# Patient Record
Sex: FEMALE
[De-identification: names, ages, dates, MRNs, and addresses within clinical notes are randomized per-mention and may not be internally consistent; named-entity substitution may affect disease eponyms.]

---

## 2017-05-03 ENCOUNTER — APPOINTMENT (OUTPATIENT)
Dept: FAMILY MEDICINE | Facility: CLINIC | Age: 57
End: 2017-05-03

## 2017-05-03 VITALS
HEIGHT: 62 IN | WEIGHT: 125 LBS | BODY MASS INDEX: 23 KG/M2 | DIASTOLIC BLOOD PRESSURE: 72 MMHG | SYSTOLIC BLOOD PRESSURE: 108 MMHG

## 2017-05-04 LAB
25(OH)D3 SERPL-MCNC: 17.2 NG/ML
CHOLEST SERPL-MCNC: 266 MG/DL
CHOLEST/HDLC SERPL: 3.5 RATIO
FOLATE SERPL-MCNC: >20 NG/ML
HBA1C MFR BLD HPLC: 6 %
HDLC SERPL-MCNC: 75 MG/DL
LDLC SERPL CALC-MCNC: 164 MG/DL
TRIGL SERPL-MCNC: 137 MG/DL
TSH SERPL-ACNC: 0.72 UIU/ML
VIT B12 SERPL-MCNC: 1025 PG/ML

## 2017-08-14 ENCOUNTER — RX RENEWAL (OUTPATIENT)
Age: 57
End: 2017-08-14

## 2017-11-27 ENCOUNTER — APPOINTMENT (OUTPATIENT)
Dept: FAMILY MEDICINE | Facility: CLINIC | Age: 57
End: 2017-11-27

## 2018-02-20 ENCOUNTER — APPOINTMENT (OUTPATIENT)
Dept: FAMILY MEDICINE | Facility: CLINIC | Age: 58
End: 2018-02-20
Payer: MEDICAID

## 2018-02-20 VITALS
HEART RATE: 80 BPM | WEIGHT: 130 LBS | BODY MASS INDEX: 23.92 KG/M2 | HEIGHT: 62 IN | TEMPERATURE: 98.3 F | OXYGEN SATURATION: 97 % | DIASTOLIC BLOOD PRESSURE: 80 MMHG | SYSTOLIC BLOOD PRESSURE: 118 MMHG

## 2018-02-20 LAB — CYTOLOGY CVX/VAG DOC THIN PREP: NORMAL

## 2018-02-20 PROCEDURE — 99213 OFFICE O/P EST LOW 20 MIN: CPT

## 2018-04-19 ENCOUNTER — APPOINTMENT (OUTPATIENT)
Dept: FAMILY MEDICINE | Facility: CLINIC | Age: 58
End: 2018-04-19

## 2018-05-09 ENCOUNTER — APPOINTMENT (OUTPATIENT)
Dept: FAMILY MEDICINE | Facility: CLINIC | Age: 58
End: 2018-05-09
Payer: COMMERCIAL

## 2018-05-09 VITALS — OXYGEN SATURATION: 98 % | HEART RATE: 64 BPM

## 2018-05-09 VITALS — HEIGHT: 62 IN | WEIGHT: 130 LBS | BODY MASS INDEX: 23.92 KG/M2

## 2018-05-09 VITALS — DIASTOLIC BLOOD PRESSURE: 80 MMHG | SYSTOLIC BLOOD PRESSURE: 110 MMHG

## 2018-05-09 DIAGNOSIS — Z87.09 PERSONAL HISTORY OF OTHER DISEASES OF THE RESPIRATORY SYSTEM: ICD-10-CM

## 2018-05-09 PROCEDURE — 99214 OFFICE O/P EST MOD 30 MIN: CPT

## 2018-05-09 RX ORDER — CEFADROXIL 500 MG/1
500 CAPSULE ORAL TWICE DAILY
Qty: 20 | Refills: 0 | Status: DISCONTINUED | COMMUNITY
Start: 2018-02-20 | End: 2018-05-09

## 2018-05-16 ENCOUNTER — APPOINTMENT (OUTPATIENT)
Dept: FAMILY MEDICINE | Facility: CLINIC | Age: 58
End: 2018-05-16
Payer: COMMERCIAL

## 2018-05-16 VITALS
TEMPERATURE: 98.2 F | HEART RATE: 74 BPM | OXYGEN SATURATION: 98 % | HEIGHT: 62 IN | WEIGHT: 130 LBS | BODY MASS INDEX: 23.92 KG/M2

## 2018-05-16 LAB — CYTOLOGY CVX/VAG DOC THIN PREP: NORMAL

## 2018-05-16 PROCEDURE — 36415 COLL VENOUS BLD VENIPUNCTURE: CPT

## 2018-05-16 PROCEDURE — 99396 PREV VISIT EST AGE 40-64: CPT | Mod: 25

## 2018-05-16 NOTE — HEALTH RISK ASSESSMENT
[Good] : ~his/her~ current health as good [Fair] :  ~his/her~ mood as fair [2] : 1) Little interest or pleasure doing things for more than half of the days (2) [1] : 2) Feeling down, depressed, or hopeless for several days (1) [Employed] : employed [Significant Other] : lives with significant other [Feels Safe at Home] : Feels safe at home [Fully functional (bathing, dressing, toileting, transferring, walking, feeding)] : Fully functional (bathing, dressing, toileting, transferring, walking, feeding) [Fully functional (using the telephone, shopping, preparing meals, housekeeping, doing laundry, using] : Fully functional and needs no help or supervision to perform IADLs (using the telephone, shopping, preparing meals, housekeeping, doing laundry, using transportation, managing medications and managing finances) [Designated Healthcare Proxy] : Designated healthcare proxy [Name: ___] : Health Care Proxy's Name: [unfilled]  [Relationship: ___] : Relationship: [unfilled] [] : No [Change in mental status noted] : No change in mental status noted [Language] : denies difficulty with language [Handling Complex Tasks] : denies difficulty handling complex tasks [Sexually Active] : not sexually active [Reports changes in hearing] : Reports no changes in hearing [Reports changes in vision] : Reports no changes in vision [Reports changes in dental health] : Reports no changes in dental health [Guns at Home] : no guns at home [Travel to Developing Areas] : does not  travel to developing areas [TB Exposure] : is not being exposed to tuberculosis [MammogramDate] : 2016 [PapSmearDate] : 2016 [ColonoscopyDate] : 2013 [de-identified] : w/ brother [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

## 2018-05-16 NOTE — COUNSELING
[Healthy eating counseling provided] : healthy eating [Activity counseling provided] : activity [Behavioral health counseling provided] : behavioral health  [Patient motivation] : Patient motivation [Good understanding] : Patient has a good understanding of lifestyle changes and the steps needed to achieve self management goals

## 2018-05-16 NOTE — PHYSICAL EXAM
[No Acute Distress] : no acute distress [Well Nourished] : well nourished [Well Developed] : well developed [Well-Appearing] : well-appearing [Normal Sclera/Conjunctiva] : normal sclera/conjunctiva [PERRL] : pupils equal round and reactive to light [EOMI] : extraocular movements intact [Normal Outer Ear/Nose] : the outer ears and nose were normal in appearance [Normal Oropharynx] : the oropharynx was normal [No JVD] : no jugular venous distention [Supple] : supple [No Lymphadenopathy] : no lymphadenopathy [Thyroid Normal, No Nodules] : the thyroid was normal and there were no nodules present [No Respiratory Distress] : no respiratory distress  [Clear to Auscultation] : lungs were clear to auscultation bilaterally [No Accessory Muscle Use] : no accessory muscle use [Normal Rate] : normal rate  [Regular Rhythm] : with a regular rhythm [Normal S1, S2] : normal S1 and S2 [No Murmur] : no murmur heard [No Carotid Bruits] : no carotid bruits [No Varicosities] : no varicosities [Pedal Pulses Present] : the pedal pulses are present [No Edema] : there was no peripheral edema [No Extremity Clubbing/Cyanosis] : no extremity clubbing/cyanosis [Soft] : abdomen soft [Non Tender] : non-tender [Non-distended] : non-distended [No HSM] : no HSM [Normal Bowel Sounds] : normal bowel sounds [Normal Posterior Cervical Nodes] : no posterior cervical lymphadenopathy [Normal Anterior Cervical Nodes] : no anterior cervical lymphadenopathy [No CVA Tenderness] : no CVA  tenderness [No Spinal Tenderness] : no spinal tenderness [No Joint Swelling] : no joint swelling [Grossly Normal Strength/Tone] : grossly normal strength/tone [No Rash] : no rash [Normal Gait] : normal gait [Coordination Grossly Intact] : coordination grossly intact [No Focal Deficits] : no focal deficits [Normal Affect] : the affect was normal [Normal Insight/Judgement] : insight and judgment were intact [Normal Appearance] : normal in appearance [No Masses] : no palpable masses [No Nipple Discharge] : no nipple discharge [No Axillary Lymphadenopathy] : no axillary lymphadenopathy [Alert and Oriented x3] : oriented to person, place, and time [de-identified] : left breast tenderness w/ palpation [de-identified] : mandible pain w/ jaw flexion [de-identified] : enlarging hyperpigmented mole like below rt breast

## 2018-05-16 NOTE — HISTORY OF PRESENT ILLNESS
[FreeTextEntry1] : c/o on and off knee pain, does not take meds\par c/o teeth grinding prob due to stress, now having mandible pain\par c/o high stress, anxiety, depression, not wanting to go to work, stopped escitalopram, but now restarted x 2 wks, also has on and off neck pain [de-identified] : 57 year old female who presents today for a complete physical exam.\par

## 2018-05-16 NOTE — REVIEW OF SYSTEMS
[Anxiety] : anxiety [Depression] : depression [Negative] : Heme/Lymph [Suicidal] : not suicidal [Insomnia] : no insomnia

## 2018-05-17 LAB
25(OH)D3 SERPL-MCNC: 16 NG/ML
ALBUMIN SERPL ELPH-MCNC: 4.4 G/DL
ALP BLD-CCNC: 72 U/L
ALT SERPL-CCNC: 24 U/L
ANION GAP SERPL CALC-SCNC: 10 MMOL/L
APPEARANCE: CLEAR
AST SERPL-CCNC: 25 U/L
BACTERIA: NEGATIVE
BASOPHILS # BLD AUTO: 0.03 K/UL
BASOPHILS NFR BLD AUTO: 0.8 %
BILIRUB SERPL-MCNC: 0.5 MG/DL
BILIRUBIN URINE: NEGATIVE
BLOOD URINE: NEGATIVE
BUN SERPL-MCNC: 10 MG/DL
CALCIUM SERPL-MCNC: 9.4 MG/DL
CHLORIDE SERPL-SCNC: 106 MMOL/L
CHOLEST SERPL-MCNC: 235 MG/DL
CHOLEST/HDLC SERPL: 4.6 RATIO
CO2 SERPL-SCNC: 24 MMOL/L
COLOR: YELLOW
CREAT SERPL-MCNC: 0.72 MG/DL
EOSINOPHIL # BLD AUTO: 0.09 K/UL
EOSINOPHIL NFR BLD AUTO: 2.4 %
GLUCOSE QUALITATIVE U: NEGATIVE MG/DL
GLUCOSE SERPL-MCNC: 106 MG/DL
HBA1C MFR BLD HPLC: 5.8 %
HCT VFR BLD CALC: 39.2 %
HDLC SERPL-MCNC: 51 MG/DL
HGB BLD-MCNC: 12.6 G/DL
HYALINE CASTS: 1 /LPF
IMM GRANULOCYTES NFR BLD AUTO: 0 %
KETONES URINE: NEGATIVE
LDLC SERPL CALC-MCNC: 116 MG/DL
LEUKOCYTE ESTERASE URINE: NEGATIVE
LYMPHOCYTES # BLD AUTO: 1.41 K/UL
LYMPHOCYTES NFR BLD AUTO: 38 %
MAN DIFF?: NORMAL
MCHC RBC-ENTMCNC: 29.2 PG
MCHC RBC-ENTMCNC: 32.1 GM/DL
MCV RBC AUTO: 90.7 FL
MICROSCOPIC-UA: NORMAL
MONOCYTES # BLD AUTO: 0.27 K/UL
MONOCYTES NFR BLD AUTO: 7.3 %
NEUTROPHILS # BLD AUTO: 1.91 K/UL
NEUTROPHILS NFR BLD AUTO: 51.5 %
NITRITE URINE: NEGATIVE
PH URINE: 7
PLATELET # BLD AUTO: 241 K/UL
POTASSIUM SERPL-SCNC: 4.5 MMOL/L
PROT SERPL-MCNC: 7.4 G/DL
PROTEIN URINE: NEGATIVE MG/DL
RBC # BLD: 4.32 M/UL
RBC # FLD: 13.8 %
RED BLOOD CELLS URINE: 6 /HPF
SODIUM SERPL-SCNC: 140 MMOL/L
SPECIFIC GRAVITY URINE: 1.02
SQUAMOUS EPITHELIAL CELLS: 1 /HPF
TRIGL SERPL-MCNC: 340 MG/DL
TSH SERPL-ACNC: 1.2 UIU/ML
UROBILINOGEN URINE: NEGATIVE MG/DL
WBC # FLD AUTO: 3.71 K/UL
WHITE BLOOD CELLS URINE: 2 /HPF

## 2018-09-19 ENCOUNTER — FORM ENCOUNTER (OUTPATIENT)
Age: 58
End: 2018-09-19

## 2018-09-20 ENCOUNTER — OUTPATIENT (OUTPATIENT)
Dept: OUTPATIENT SERVICES | Facility: HOSPITAL | Age: 58
LOS: 1 days | End: 2018-09-20
Payer: COMMERCIAL

## 2018-09-20 ENCOUNTER — APPOINTMENT (OUTPATIENT)
Dept: ULTRASOUND IMAGING | Facility: CLINIC | Age: 58
End: 2018-09-20

## 2018-09-20 ENCOUNTER — APPOINTMENT (OUTPATIENT)
Dept: MAMMOGRAPHY | Facility: CLINIC | Age: 58
End: 2018-09-20

## 2018-09-20 ENCOUNTER — APPOINTMENT (OUTPATIENT)
Dept: RADIOLOGY | Facility: CLINIC | Age: 58
End: 2018-09-20

## 2018-09-20 ENCOUNTER — APPOINTMENT (OUTPATIENT)
Dept: DERMATOLOGY | Facility: CLINIC | Age: 58
End: 2018-09-20
Payer: COMMERCIAL

## 2018-09-20 VITALS
HEIGHT: 62 IN | DIASTOLIC BLOOD PRESSURE: 82 MMHG | WEIGHT: 130 LBS | BODY MASS INDEX: 23.92 KG/M2 | SYSTOLIC BLOOD PRESSURE: 124 MMHG

## 2018-09-20 DIAGNOSIS — L82.1 OTHER SEBORRHEIC KERATOSIS: ICD-10-CM

## 2018-09-20 DIAGNOSIS — Z84.0 FAMILY HISTORY OF DISEASES OF THE SKIN AND SUBCUTANEOUS TISSUE: ICD-10-CM

## 2018-09-20 DIAGNOSIS — Z87.19 PERSONAL HISTORY OF OTHER DISEASES OF THE DIGESTIVE SYSTEM: ICD-10-CM

## 2018-09-20 DIAGNOSIS — Z87.898 PERSONAL HISTORY OF OTHER SPECIFIED CONDITIONS: ICD-10-CM

## 2018-09-20 DIAGNOSIS — Z00.8 ENCOUNTER FOR OTHER GENERAL EXAMINATION: ICD-10-CM

## 2018-09-20 DIAGNOSIS — L81.9 DISORDER OF PIGMENTATION, UNSPECIFIED: ICD-10-CM

## 2018-09-20 PROCEDURE — 76641 ULTRASOUND BREAST COMPLETE: CPT

## 2018-09-20 PROCEDURE — 77080 DXA BONE DENSITY AXIAL: CPT | Mod: 26

## 2018-09-20 PROCEDURE — 76641 ULTRASOUND BREAST COMPLETE: CPT | Mod: 26,50

## 2018-09-20 PROCEDURE — 77080 DXA BONE DENSITY AXIAL: CPT

## 2018-09-20 PROCEDURE — 77067 SCR MAMMO BI INCL CAD: CPT | Mod: 26

## 2018-09-20 PROCEDURE — 77063 BREAST TOMOSYNTHESIS BI: CPT

## 2018-09-20 PROCEDURE — 77067 SCR MAMMO BI INCL CAD: CPT

## 2018-09-20 PROCEDURE — 99202 OFFICE O/P NEW SF 15 MIN: CPT

## 2018-09-20 PROCEDURE — 76700 US EXAM ABDOM COMPLETE: CPT | Mod: 26

## 2018-09-20 PROCEDURE — 76700 US EXAM ABDOM COMPLETE: CPT

## 2018-09-20 PROCEDURE — 77063 BREAST TOMOSYNTHESIS BI: CPT | Mod: 26

## 2018-11-21 ENCOUNTER — MEDICATION RENEWAL (OUTPATIENT)
Age: 58
End: 2018-11-21

## 2018-12-27 ENCOUNTER — RX RENEWAL (OUTPATIENT)
Age: 58
End: 2018-12-27

## 2019-01-14 ENCOUNTER — RX RENEWAL (OUTPATIENT)
Age: 59
End: 2019-01-14

## 2019-01-28 ENCOUNTER — RX RENEWAL (OUTPATIENT)
Age: 59
End: 2019-01-28

## 2019-02-04 ENCOUNTER — RX RENEWAL (OUTPATIENT)
Age: 59
End: 2019-02-04

## 2019-02-27 ENCOUNTER — RX RENEWAL (OUTPATIENT)
Age: 59
End: 2019-02-27

## 2019-03-07 ENCOUNTER — RX RENEWAL (OUTPATIENT)
Age: 59
End: 2019-03-07

## 2019-03-11 ENCOUNTER — RX RENEWAL (OUTPATIENT)
Age: 59
End: 2019-03-11

## 2019-06-20 ENCOUNTER — APPOINTMENT (OUTPATIENT)
Dept: FAMILY MEDICINE | Facility: CLINIC | Age: 59
End: 2019-06-20
Payer: COMMERCIAL

## 2019-06-20 ENCOUNTER — NON-APPOINTMENT (OUTPATIENT)
Age: 59
End: 2019-06-20

## 2019-06-20 VITALS
DIASTOLIC BLOOD PRESSURE: 80 MMHG | BODY MASS INDEX: 23 KG/M2 | OXYGEN SATURATION: 98 % | HEART RATE: 76 BPM | RESPIRATION RATE: 16 BRPM | HEIGHT: 62 IN | WEIGHT: 125 LBS | SYSTOLIC BLOOD PRESSURE: 120 MMHG

## 2019-06-20 DIAGNOSIS — R10.11 RIGHT UPPER QUADRANT PAIN: ICD-10-CM

## 2019-06-20 DIAGNOSIS — H10.13 ACUTE ATOPIC CONJUNCTIVITIS, BILATERAL: ICD-10-CM

## 2019-06-20 DIAGNOSIS — L98.9 DISORDER OF THE SKIN AND SUBCUTANEOUS TISSUE, UNSPECIFIED: ICD-10-CM

## 2019-06-20 DIAGNOSIS — M53.82 OTHER SPECIFIED DORSOPATHIES, CERVICAL REGION: ICD-10-CM

## 2019-06-20 DIAGNOSIS — M26.629 ARTHRALGIA OF TEMPOROMANDIBULAR JOINT,: ICD-10-CM

## 2019-06-20 DIAGNOSIS — M54.2 CERVICALGIA: ICD-10-CM

## 2019-06-20 PROCEDURE — 99396 PREV VISIT EST AGE 40-64: CPT | Mod: 25

## 2019-06-20 PROCEDURE — 93000 ELECTROCARDIOGRAM COMPLETE: CPT

## 2019-06-20 PROCEDURE — 36415 COLL VENOUS BLD VENIPUNCTURE: CPT

## 2019-06-20 RX ORDER — CROMOLYN SODIUM 40 MG/ML
4 SOLUTION/ DROPS OPHTHALMIC 4 TIMES DAILY
Qty: 1 | Refills: 2 | Status: DISCONTINUED | COMMUNITY
Start: 2018-05-23 | End: 2019-06-20

## 2019-06-20 NOTE — HEALTH RISK ASSESSMENT
[Fair] :  ~his/her~ mood as fair [Good] : ~his/her~ current health as good [1] : 1) Little interest or pleasure doing things for several days (1) [Patient declined colonoscopy] : Patient declined colonoscopy [] :  [Employed] : employed [# of Members in Household ___] :  household currently consist of [unfilled] member(s) [Fully functional (bathing, dressing, toileting, transferring, walking, feeding)] : Fully functional (bathing, dressing, toileting, transferring, walking, feeding) [Fully functional (using the telephone, shopping, preparing meals, housekeeping, doing laundry, using] : Fully functional and needs no help or supervision to perform IADLs (using the telephone, shopping, preparing meals, housekeeping, doing laundry, using transportation, managing medications and managing finances) [Feels Safe at Home] : Feels safe at home [Seat Belt] :  uses seat belt [Travel to Developing Areas] : travel to developing areas [] : No [Change in mental status noted] : No change in mental status noted [Language] : denies difficulty with language [Handling Complex Tasks] : denies difficulty handling complex tasks [Sexually Active] : not sexually active [High Risk Behavior] : no high risk behavior [Reports changes in hearing] : Reports no changes in hearing [Reports changes in vision] : Reports no changes in vision [Reports changes in dental health] : Reports no changes in dental health [TB Exposure] : is not being exposed to tuberculosis [MammogramDate] : 09/18 [PapSmearDate] : 06/16 [BoneDensityDate] : 09/18 [de-identified] : 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

## 2019-06-20 NOTE — PHYSICAL EXAM
[Well Nourished] : well nourished [No Acute Distress] : no acute distress [Normal Sclera/Conjunctiva] : normal sclera/conjunctiva [Well Developed] : well developed [Well-Appearing] : well-appearing [EOMI] : extraocular movements intact [Normal Outer Ear/Nose] : the outer ears and nose were normal in appearance [PERRL] : pupils equal round and reactive to light [Normal Oropharynx] : the oropharynx was normal [No JVD] : no jugular venous distention [Supple] : supple [Thyroid Normal, No Nodules] : the thyroid was normal and there were no nodules present [No Respiratory Distress] : no respiratory distress  [No Lymphadenopathy] : no lymphadenopathy [Clear to Auscultation] : lungs were clear to auscultation bilaterally [No Accessory Muscle Use] : no accessory muscle use [Regular Rhythm] : with a regular rhythm [Normal S1, S2] : normal S1 and S2 [Normal Rate] : normal rate  [No Carotid Bruits] : no carotid bruits [No Murmur] : no murmur heard [No Varicosities] : no varicosities [No Abdominal Bruit] : a ~M bruit was not heard ~T in the abdomen [Pedal Pulses Present] : the pedal pulses are present [No Edema] : there was no peripheral edema [Normal Appearance] : normal in appearance [No Extremity Clubbing/Cyanosis] : no extremity clubbing/cyanosis [No Palpable Aorta] : no palpable aorta [No Axillary Lymphadenopathy] : no axillary lymphadenopathy [No Nipple Discharge] : no nipple discharge [Non Tender] : non-tender [Soft] : abdomen soft [Non-distended] : non-distended [No Masses] : no abdominal mass palpated [No HSM] : no HSM [Normal Posterior Cervical Nodes] : no posterior cervical lymphadenopathy [Normal Bowel Sounds] : normal bowel sounds [Normal Anterior Cervical Nodes] : no anterior cervical lymphadenopathy [No Spinal Tenderness] : no spinal tenderness [No CVA Tenderness] : no CVA  tenderness [No Joint Swelling] : no joint swelling [Grossly Normal Strength/Tone] : grossly normal strength/tone [Coordination Grossly Intact] : coordination grossly intact [No Rash] : no rash [Normal Gait] : normal gait [No Focal Deficits] : no focal deficits [Deep Tendon Reflexes (DTR)] : deep tendon reflexes were 2+ and symmetric [Normal Affect] : the affect was normal [Normal Insight/Judgement] : insight and judgment were intact [de-identified] : injected nasal turbinates [de-identified] : multiple skin lesions

## 2019-06-24 LAB
25(OH)D3 SERPL-MCNC: 20 NG/ML
ALBUMIN SERPL ELPH-MCNC: 4.5 G/DL
ALP BLD-CCNC: 70 U/L
ALT SERPL-CCNC: 18 U/L
ANA SER IF-ACNC: NEGATIVE
ANION GAP SERPL CALC-SCNC: 11 MMOL/L
APPEARANCE: CLEAR
AST SERPL-CCNC: 20 U/L
BACTERIA: NEGATIVE
BASOPHILS # BLD AUTO: 0.05 K/UL
BASOPHILS NFR BLD AUTO: 1.5 %
BILIRUB SERPL-MCNC: 0.5 MG/DL
BILIRUBIN URINE: NEGATIVE
BLOOD URINE: NEGATIVE
BUN SERPL-MCNC: 11 MG/DL
CALCIUM SERPL-MCNC: 9.3 MG/DL
CHLORIDE SERPL-SCNC: 106 MMOL/L
CHOLEST SERPL-MCNC: 243 MG/DL
CHOLEST/HDLC SERPL: 4.3 RATIO
CK SERPL-CCNC: 150 U/L
CO2 SERPL-SCNC: 24 MMOL/L
COLOR: NORMAL
CREAT SERPL-MCNC: 0.71 MG/DL
CRP SERPL-MCNC: <0.1 MG/DL
EBV EA AB SER IA-ACNC: <5 U/ML
EBV EA AB TITR SER IF: POSITIVE
EBV EA IGG SER QL IA: 514 U/ML
EBV EA IGG SER-ACNC: NEGATIVE
EBV EA IGM SER IA-ACNC: NEGATIVE
EBV PATRN SPEC IB-IMP: NORMAL
EBV VCA IGG SER IA-ACNC: >750 U/ML
EBV VCA IGM SER QL IA: <10 U/ML
EOSINOPHIL # BLD AUTO: 0.05 K/UL
EOSINOPHIL NFR BLD AUTO: 1.5 %
EPSTEIN-BARR VIRUS CAPSID ANTIGEN IGG: POSITIVE
ERYTHROCYTE [SEDIMENTATION RATE] IN BLOOD BY WESTERGREN METHOD: 10 MM/HR
ESTIMATED AVERAGE GLUCOSE: 120 MG/DL
FERRITIN SERPL-MCNC: 74 NG/ML
FOLATE SERPL-MCNC: >20 NG/ML
GLUCOSE QUALITATIVE U: NEGATIVE
GLUCOSE SERPL-MCNC: 103 MG/DL
HBA1C MFR BLD HPLC: 5.8 %
HCT VFR BLD CALC: 37.9 %
HDLC SERPL-MCNC: 56 MG/DL
HGB BLD-MCNC: 12.4 G/DL
HYALINE CASTS: 2 /LPF
IMM GRANULOCYTES NFR BLD AUTO: 0 %
IRON SATN MFR SERPL: 32 %
IRON SERPL-MCNC: 86 UG/DL
KETONES URINE: NEGATIVE
LDLC SERPL CALC-MCNC: 153 MG/DL
LEUKOCYTE ESTERASE URINE: NEGATIVE
LYMPHOCYTES # BLD AUTO: 1.52 K/UL
LYMPHOCYTES NFR BLD AUTO: 45.9 %
MAGNESIUM SERPL-MCNC: 2.1 MG/DL
MAN DIFF?: NORMAL
MCHC RBC-ENTMCNC: 29.8 PG
MCHC RBC-ENTMCNC: 32.7 GM/DL
MCV RBC AUTO: 91.1 FL
MICROSCOPIC-UA: NORMAL
MONOCYTES # BLD AUTO: 0.29 K/UL
MONOCYTES NFR BLD AUTO: 8.8 %
NEUTROPHILS # BLD AUTO: 1.4 K/UL
NEUTROPHILS NFR BLD AUTO: 42.3 %
NITRITE URINE: NEGATIVE
PH URINE: 7
PLATELET # BLD AUTO: 218 K/UL
POTASSIUM SERPL-SCNC: 4.8 MMOL/L
PROT SERPL-MCNC: 6.9 G/DL
PROTEIN URINE: NEGATIVE
RBC # BLD: 4.16 M/UL
RBC # FLD: 13.2 %
RED BLOOD CELLS URINE: 1 /HPF
RHEUMATOID FACT SER QL: <10 IU/ML
SODIUM SERPL-SCNC: 141 MMOL/L
SPECIFIC GRAVITY URINE: 1.02
SQUAMOUS EPITHELIAL CELLS: 1 /HPF
TIBC SERPL-MCNC: 273 UG/DL
TRIGL SERPL-MCNC: 171 MG/DL
TSH SERPL-ACNC: 1.06 UIU/ML
UIBC SERPL-MCNC: 187 UG/DL
URATE SERPL-MCNC: 4.3 MG/DL
UROBILINOGEN URINE: NORMAL
VIT B12 SERPL-MCNC: 659 PG/ML
WBC # FLD AUTO: 3.31 K/UL
WHITE BLOOD CELLS URINE: 1 /HPF

## 2019-07-03 LAB
B BURGDOR AB SER-IMP: NEGATIVE
B BURGDOR IGM PATRN SER IB-IMP: NEGATIVE
B BURGDOR18KD IGG SER QL IB: NORMAL
B BURGDOR23KD IGG SER QL IB: NORMAL
B BURGDOR23KD IGM SER QL IB: NORMAL
B BURGDOR28KD IGG SER QL IB: NORMAL
B BURGDOR30KD IGG SER QL IB: NORMAL
B BURGDOR31KD IGG SER QL IB: NORMAL
B BURGDOR39KD IGG SER QL IB: NORMAL
B BURGDOR39KD IGM SER QL IB: NORMAL
B BURGDOR41KD IGG SER QL IB: PRESENT
B BURGDOR41KD IGM SER QL IB: PRESENT
B BURGDOR45KD IGG SER QL IB: NORMAL
B BURGDOR58KD IGG SER QL IB: NORMAL
B BURGDOR66KD IGG SER QL IB: NORMAL
B BURGDOR93KD IGG SER QL IB: NORMAL

## 2019-07-23 ENCOUNTER — EMERGENCY (EMERGENCY)
Facility: HOSPITAL | Age: 59
LOS: 1 days | Discharge: HOME | End: 2019-07-23
Attending: EMERGENCY MEDICINE
Payer: COMMERCIAL

## 2019-07-23 VITALS
HEIGHT: 62 IN | OXYGEN SATURATION: 98 % | WEIGHT: 130.07 LBS | HEART RATE: 68 BPM | TEMPERATURE: 98 F | RESPIRATION RATE: 16 BRPM | DIASTOLIC BLOOD PRESSURE: 82 MMHG | SYSTOLIC BLOOD PRESSURE: 146 MMHG

## 2019-07-23 LAB
ALBUMIN SERPL ELPH-MCNC: 3.7 G/DL — SIGNIFICANT CHANGE UP (ref 3.5–5)
ALP SERPL-CCNC: 74 U/L — SIGNIFICANT CHANGE UP (ref 40–120)
ALT FLD-CCNC: 30 U/L DA — SIGNIFICANT CHANGE UP (ref 10–60)
ANION GAP SERPL CALC-SCNC: 9 MMOL/L — SIGNIFICANT CHANGE UP (ref 5–17)
APTT BLD: 24.3 SEC — LOW (ref 27.5–36.3)
AST SERPL-CCNC: 20 U/L — SIGNIFICANT CHANGE UP (ref 10–40)
BILIRUB SERPL-MCNC: 0.4 MG/DL — SIGNIFICANT CHANGE UP (ref 0.2–1.2)
BUN SERPL-MCNC: 16 MG/DL — SIGNIFICANT CHANGE UP (ref 7–18)
CALCIUM SERPL-MCNC: 8.8 MG/DL — SIGNIFICANT CHANGE UP (ref 8.4–10.5)
CHLORIDE SERPL-SCNC: 106 MMOL/L — SIGNIFICANT CHANGE UP (ref 96–108)
CO2 SERPL-SCNC: 27 MMOL/L — SIGNIFICANT CHANGE UP (ref 22–31)
CREAT SERPL-MCNC: 0.74 MG/DL — SIGNIFICANT CHANGE UP (ref 0.5–1.3)
GLUCOSE SERPL-MCNC: 100 MG/DL — HIGH (ref 70–99)
HCT VFR BLD CALC: 36.4 % — SIGNIFICANT CHANGE UP (ref 34.5–45)
HGB BLD-MCNC: 12.1 G/DL — SIGNIFICANT CHANGE UP (ref 11.5–15.5)
INR BLD: 1.03 RATIO — SIGNIFICANT CHANGE UP (ref 0.88–1.16)
LACTATE SERPL-SCNC: 1 MMOL/L — SIGNIFICANT CHANGE UP (ref 0.7–2)
LIDOCAIN IGE QN: 198 U/L — SIGNIFICANT CHANGE UP (ref 73–393)
MCHC RBC-ENTMCNC: 29.7 PG — SIGNIFICANT CHANGE UP (ref 27–34)
MCHC RBC-ENTMCNC: 33.2 GM/DL — SIGNIFICANT CHANGE UP (ref 32–36)
MCV RBC AUTO: 89.4 FL — SIGNIFICANT CHANGE UP (ref 80–100)
NRBC # BLD: 0 /100 WBCS — SIGNIFICANT CHANGE UP (ref 0–0)
PLATELET # BLD AUTO: 188 K/UL — SIGNIFICANT CHANGE UP (ref 150–400)
POTASSIUM SERPL-MCNC: 3.6 MMOL/L — SIGNIFICANT CHANGE UP (ref 3.5–5.3)
POTASSIUM SERPL-SCNC: 3.6 MMOL/L — SIGNIFICANT CHANGE UP (ref 3.5–5.3)
PROT SERPL-MCNC: 7.3 G/DL — SIGNIFICANT CHANGE UP (ref 6–8.3)
PROTHROM AB SERPL-ACNC: 11.4 SEC — SIGNIFICANT CHANGE UP (ref 10–12.9)
RBC # BLD: 4.07 M/UL — SIGNIFICANT CHANGE UP (ref 3.8–5.2)
RBC # FLD: 13.3 % — SIGNIFICANT CHANGE UP (ref 10.3–14.5)
SODIUM SERPL-SCNC: 142 MMOL/L — SIGNIFICANT CHANGE UP (ref 135–145)
TROPONIN I SERPL-MCNC: <0.015 NG/ML — SIGNIFICANT CHANGE UP (ref 0–0.04)
WBC # BLD: 5.63 K/UL — SIGNIFICANT CHANGE UP (ref 3.8–10.5)
WBC # FLD AUTO: 5.63 K/UL — SIGNIFICANT CHANGE UP (ref 3.8–10.5)

## 2019-07-23 PROCEDURE — 99285 EMERGENCY DEPT VISIT HI MDM: CPT

## 2019-07-23 PROCEDURE — 85027 COMPLETE CBC AUTOMATED: CPT

## 2019-07-23 PROCEDURE — 36415 COLL VENOUS BLD VENIPUNCTURE: CPT

## 2019-07-23 PROCEDURE — 85610 PROTHROMBIN TIME: CPT

## 2019-07-23 PROCEDURE — 71045 X-RAY EXAM CHEST 1 VIEW: CPT

## 2019-07-23 PROCEDURE — 71045 X-RAY EXAM CHEST 1 VIEW: CPT | Mod: 26

## 2019-07-23 PROCEDURE — 80053 COMPREHEN METABOLIC PANEL: CPT

## 2019-07-23 PROCEDURE — 83690 ASSAY OF LIPASE: CPT

## 2019-07-23 PROCEDURE — 93005 ELECTROCARDIOGRAM TRACING: CPT

## 2019-07-23 PROCEDURE — 99284 EMERGENCY DEPT VISIT MOD MDM: CPT | Mod: 25

## 2019-07-23 PROCEDURE — 85730 THROMBOPLASTIN TIME PARTIAL: CPT

## 2019-07-23 PROCEDURE — 84484 ASSAY OF TROPONIN QUANT: CPT

## 2019-07-23 PROCEDURE — 83605 ASSAY OF LACTIC ACID: CPT

## 2019-07-23 RX ORDER — SODIUM CHLORIDE 9 MG/ML
1000 INJECTION INTRAMUSCULAR; INTRAVENOUS; SUBCUTANEOUS ONCE
Refills: 0 | Status: COMPLETED | OUTPATIENT
Start: 2019-07-23 | End: 2019-07-23

## 2019-07-23 RX ADMIN — SODIUM CHLORIDE 1000 MILLILITER(S): 9 INJECTION INTRAMUSCULAR; INTRAVENOUS; SUBCUTANEOUS at 21:48

## 2019-07-23 NOTE — ED PROVIDER NOTE - CLINICAL SUMMARY MEDICAL DECISION MAKING FREE TEXT BOX
59 y/o woman, p/w syncopal episode, but no injury and feels better, asymptomatic--labs, EKG, IVF, reassess.

## 2019-07-23 NOTE — ED PROVIDER NOTE - PROGRESS NOTE DETAILS
Pt has no symptoms, ED w/u unremarkable.  No serious cause of syncope suspected.  Advised strict return precautions and PMD f/u.

## 2019-07-23 NOTE — ED PROVIDER NOTE - OBJECTIVE STATEMENT
59 y/o woman, h/o depression, HL, presents following syncopal episode, but denies any other symptoms and feels back to usual baseline.  She recalls bringing herself safely down to seated position prior to fainting and denies any injury.  Apparently bystanders called for EMS.  Pt's family, present at bedside, did not witness the incident.  Pt denies dizziness/CP/SOB/fever/chills/N/V/palpitations/weakness/numbness.  She has never had similar episode to her recollection.

## 2019-07-23 NOTE — ED ADULT NURSE NOTE - OBJECTIVE STATEMENT
pt came in the er with c/o abdominal pain .pt denies pain/n/v /dizziness ."pt says that [place] the train "

## 2019-07-24 VITALS
SYSTOLIC BLOOD PRESSURE: 120 MMHG | DIASTOLIC BLOOD PRESSURE: 64 MMHG | RESPIRATION RATE: 16 BRPM | OXYGEN SATURATION: 98 % | HEART RATE: 74 BPM | TEMPERATURE: 98 F

## 2019-07-24 RX ADMIN — SODIUM CHLORIDE 1000 MILLILITER(S): 9 INJECTION INTRAMUSCULAR; INTRAVENOUS; SUBCUTANEOUS at 00:02

## 2019-07-31 ENCOUNTER — INPATIENT (INPATIENT)
Facility: HOSPITAL | Age: 59
LOS: 1 days | Discharge: HOME | End: 2019-08-02
Attending: SURGERY | Admitting: SURGERY
Payer: COMMERCIAL

## 2019-07-31 VITALS — TEMPERATURE: 98 F | WEIGHT: 126.99 LBS | OXYGEN SATURATION: 97 % | HEIGHT: 61 IN

## 2019-07-31 DIAGNOSIS — E78.5 HYPERLIPIDEMIA, UNSPECIFIED: ICD-10-CM

## 2019-07-31 DIAGNOSIS — F32.9 MAJOR DEPRESSIVE DISORDER, SINGLE EPISODE, UNSPECIFIED: ICD-10-CM

## 2019-07-31 DIAGNOSIS — Z98.891 HISTORY OF UTERINE SCAR FROM PREVIOUS SURGERY: Chronic | ICD-10-CM

## 2019-07-31 DIAGNOSIS — K56.609 UNSPECIFIED INTESTINAL OBSTRUCTION, UNSPECIFIED AS TO PARTIAL VERSUS COMPLETE OBSTRUCTION: ICD-10-CM

## 2019-07-31 LAB
ALBUMIN SERPL ELPH-MCNC: 4.9 G/DL — SIGNIFICANT CHANGE UP (ref 3.3–5)
ALP SERPL-CCNC: 97 U/L — SIGNIFICANT CHANGE UP (ref 40–120)
ALT FLD-CCNC: 25 U/L — SIGNIFICANT CHANGE UP (ref 12–78)
ANION GAP SERPL CALC-SCNC: 11 MMOL/L — SIGNIFICANT CHANGE UP (ref 5–17)
APPEARANCE UR: CLEAR — SIGNIFICANT CHANGE UP
APTT BLD: 24.8 SEC — LOW (ref 27.5–36.3)
AST SERPL-CCNC: 23 U/L — SIGNIFICANT CHANGE UP (ref 15–37)
BASOPHILS # BLD AUTO: 0.03 K/UL — SIGNIFICANT CHANGE UP (ref 0–0.2)
BASOPHILS NFR BLD AUTO: 0.3 % — SIGNIFICANT CHANGE UP (ref 0–2)
BILIRUB SERPL-MCNC: 1.1 MG/DL — SIGNIFICANT CHANGE UP (ref 0.2–1.2)
BILIRUB UR-MCNC: NEGATIVE — SIGNIFICANT CHANGE UP
BLD GP AB SCN SERPL QL: SIGNIFICANT CHANGE UP
BUN SERPL-MCNC: 13 MG/DL — SIGNIFICANT CHANGE UP (ref 7–23)
CALCIUM SERPL-MCNC: 9.8 MG/DL — SIGNIFICANT CHANGE UP (ref 8.5–10.1)
CHLORIDE SERPL-SCNC: 105 MMOL/L — SIGNIFICANT CHANGE UP (ref 96–108)
CO2 SERPL-SCNC: 25 MMOL/L — SIGNIFICANT CHANGE UP (ref 22–31)
COLOR SPEC: YELLOW — SIGNIFICANT CHANGE UP
CREAT SERPL-MCNC: 0.97 MG/DL — SIGNIFICANT CHANGE UP (ref 0.5–1.3)
DIFF PNL FLD: ABNORMAL
EOSINOPHIL # BLD AUTO: 0 K/UL — SIGNIFICANT CHANGE UP (ref 0–0.5)
EOSINOPHIL NFR BLD AUTO: 0 % — SIGNIFICANT CHANGE UP (ref 0–6)
EPI CELLS # UR: SIGNIFICANT CHANGE UP
GLUCOSE SERPL-MCNC: 155 MG/DL — HIGH (ref 70–99)
GLUCOSE UR QL: NEGATIVE MG/DL — SIGNIFICANT CHANGE UP
HCT VFR BLD CALC: 45.3 % — HIGH (ref 34.5–45)
HGB BLD-MCNC: 15.6 G/DL — HIGH (ref 11.5–15.5)
IMM GRANULOCYTES NFR BLD AUTO: 0.4 % — SIGNIFICANT CHANGE UP (ref 0–1.5)
INR BLD: 0.97 RATIO — SIGNIFICANT CHANGE UP (ref 0.88–1.16)
KETONES UR-MCNC: ABNORMAL
LACTATE SERPL-SCNC: 1 MMOL/L — SIGNIFICANT CHANGE UP (ref 0.7–2)
LACTATE SERPL-SCNC: 1.3 MMOL/L — SIGNIFICANT CHANGE UP (ref 0.7–2)
LACTATE SERPL-SCNC: 3.6 MMOL/L — HIGH (ref 0.7–2)
LEUKOCYTE ESTERASE UR-ACNC: NEGATIVE — SIGNIFICANT CHANGE UP
LIDOCAIN IGE QN: 222 U/L — SIGNIFICANT CHANGE UP (ref 73–393)
LYMPHOCYTES # BLD AUTO: 1.82 K/UL — SIGNIFICANT CHANGE UP (ref 1–3.3)
LYMPHOCYTES # BLD AUTO: 16.4 % — SIGNIFICANT CHANGE UP (ref 13–44)
MAGNESIUM SERPL-MCNC: 2.3 MG/DL — SIGNIFICANT CHANGE UP (ref 1.6–2.6)
MCHC RBC-ENTMCNC: 29.5 PG — SIGNIFICANT CHANGE UP (ref 27–34)
MCHC RBC-ENTMCNC: 34.4 GM/DL — SIGNIFICANT CHANGE UP (ref 32–36)
MCV RBC AUTO: 85.8 FL — SIGNIFICANT CHANGE UP (ref 80–100)
MONOCYTES # BLD AUTO: 0.64 K/UL — SIGNIFICANT CHANGE UP (ref 0–0.9)
MONOCYTES NFR BLD AUTO: 5.8 % — SIGNIFICANT CHANGE UP (ref 2–14)
NEUTROPHILS # BLD AUTO: 8.59 K/UL — HIGH (ref 1.8–7.4)
NEUTROPHILS NFR BLD AUTO: 77.1 % — HIGH (ref 43–77)
NITRITE UR-MCNC: NEGATIVE — SIGNIFICANT CHANGE UP
NRBC # BLD: 0 /100 WBCS — SIGNIFICANT CHANGE UP (ref 0–0)
PH UR: 7 — SIGNIFICANT CHANGE UP (ref 5–8)
PLATELET # BLD AUTO: 314 K/UL — SIGNIFICANT CHANGE UP (ref 150–400)
POTASSIUM SERPL-MCNC: 3.2 MMOL/L — LOW (ref 3.5–5.3)
POTASSIUM SERPL-SCNC: 3.2 MMOL/L — LOW (ref 3.5–5.3)
PROT SERPL-MCNC: 9.3 GM/DL — HIGH (ref 6–8.3)
PROT UR-MCNC: 30 MG/DL
PROTHROM AB SERPL-ACNC: 10.9 SEC — SIGNIFICANT CHANGE UP (ref 10–12.9)
RBC # BLD: 5.28 M/UL — HIGH (ref 3.8–5.2)
RBC # FLD: 13.3 % — SIGNIFICANT CHANGE UP (ref 10.3–14.5)
RBC CASTS # UR COMP ASSIST: ABNORMAL /HPF (ref 0–4)
SODIUM SERPL-SCNC: 141 MMOL/L — SIGNIFICANT CHANGE UP (ref 135–145)
SP GR SPEC: 1 — LOW (ref 1.01–1.02)
TROPONIN I SERPL-MCNC: <.015 NG/ML — SIGNIFICANT CHANGE UP (ref 0.01–0.04)
UROBILINOGEN FLD QL: NEGATIVE MG/DL — SIGNIFICANT CHANGE UP
WBC # BLD: 11.12 K/UL — HIGH (ref 3.8–10.5)
WBC # FLD AUTO: 11.12 K/UL — HIGH (ref 3.8–10.5)
WBC UR QL: SIGNIFICANT CHANGE UP

## 2019-07-31 PROCEDURE — 74177 CT ABD & PELVIS W/CONTRAST: CPT | Mod: 26

## 2019-07-31 PROCEDURE — 71045 X-RAY EXAM CHEST 1 VIEW: CPT | Mod: 26

## 2019-07-31 PROCEDURE — 93010 ELECTROCARDIOGRAM REPORT: CPT

## 2019-07-31 PROCEDURE — 99285 EMERGENCY DEPT VISIT HI MDM: CPT

## 2019-07-31 RX ORDER — FAMOTIDINE 10 MG/ML
20 INJECTION INTRAVENOUS ONCE
Refills: 0 | Status: COMPLETED | OUTPATIENT
Start: 2019-07-31 | End: 2019-07-31

## 2019-07-31 RX ORDER — ONDANSETRON 8 MG/1
4 TABLET, FILM COATED ORAL EVERY 6 HOURS
Refills: 0 | Status: DISCONTINUED | OUTPATIENT
Start: 2019-07-31 | End: 2019-08-02

## 2019-07-31 RX ORDER — ONDANSETRON 8 MG/1
8 TABLET, FILM COATED ORAL ONCE
Refills: 0 | Status: COMPLETED | OUTPATIENT
Start: 2019-07-31 | End: 2019-07-31

## 2019-07-31 RX ORDER — SODIUM CHLORIDE 9 MG/ML
1000 INJECTION, SOLUTION INTRAVENOUS
Refills: 0 | Status: DISCONTINUED | OUTPATIENT
Start: 2019-07-31 | End: 2019-07-31

## 2019-07-31 RX ORDER — DEXTROSE MONOHYDRATE, SODIUM CHLORIDE, AND POTASSIUM CHLORIDE 50; .745; 4.5 G/1000ML; G/1000ML; G/1000ML
1000 INJECTION, SOLUTION INTRAVENOUS
Refills: 0 | Status: DISCONTINUED | OUTPATIENT
Start: 2019-07-31 | End: 2019-08-02

## 2019-07-31 RX ORDER — FAMOTIDINE 10 MG/ML
20 INJECTION INTRAVENOUS DAILY
Refills: 0 | Status: DISCONTINUED | OUTPATIENT
Start: 2019-07-31 | End: 2019-08-02

## 2019-07-31 RX ORDER — SODIUM CHLORIDE 9 MG/ML
1000 INJECTION INTRAMUSCULAR; INTRAVENOUS; SUBCUTANEOUS ONCE
Refills: 0 | Status: COMPLETED | OUTPATIENT
Start: 2019-07-31 | End: 2019-07-31

## 2019-07-31 RX ORDER — METOCLOPRAMIDE HCL 10 MG
10 TABLET ORAL ONCE
Refills: 0 | Status: COMPLETED | OUTPATIENT
Start: 2019-07-31 | End: 2019-07-31

## 2019-07-31 RX ORDER — POTASSIUM CHLORIDE 20 MEQ
40 PACKET (EA) ORAL ONCE
Refills: 0 | Status: COMPLETED | OUTPATIENT
Start: 2019-07-31 | End: 2019-07-31

## 2019-07-31 RX ORDER — MORPHINE SULFATE 50 MG/1
4 CAPSULE, EXTENDED RELEASE ORAL ONCE
Refills: 0 | Status: DISCONTINUED | OUTPATIENT
Start: 2019-07-31 | End: 2019-07-31

## 2019-07-31 RX ORDER — FAMOTIDINE 10 MG/ML
20 INJECTION INTRAVENOUS DAILY
Refills: 0 | Status: DISCONTINUED | OUTPATIENT
Start: 2019-07-31 | End: 2019-07-31

## 2019-07-31 RX ORDER — IOHEXOL 300 MG/ML
30 INJECTION, SOLUTION INTRAVENOUS ONCE
Refills: 0 | Status: COMPLETED | OUTPATIENT
Start: 2019-07-31 | End: 2019-07-31

## 2019-07-31 RX ORDER — SUCRALFATE 1 G
1 TABLET ORAL ONCE
Refills: 0 | Status: DISCONTINUED | OUTPATIENT
Start: 2019-07-31 | End: 2019-07-31

## 2019-07-31 RX ADMIN — FAMOTIDINE 20 MILLIGRAM(S): 10 INJECTION INTRAVENOUS at 15:13

## 2019-07-31 RX ADMIN — SODIUM CHLORIDE 125 MILLILITER(S): 9 INJECTION, SOLUTION INTRAVENOUS at 15:14

## 2019-07-31 RX ADMIN — DEXTROSE MONOHYDRATE, SODIUM CHLORIDE, AND POTASSIUM CHLORIDE 125 MILLILITER(S): 50; .745; 4.5 INJECTION, SOLUTION INTRAVENOUS at 18:16

## 2019-07-31 RX ADMIN — SODIUM CHLORIDE 1000 MILLILITER(S): 9 INJECTION INTRAMUSCULAR; INTRAVENOUS; SUBCUTANEOUS at 10:42

## 2019-07-31 RX ADMIN — ONDANSETRON 8 MILLIGRAM(S): 8 TABLET, FILM COATED ORAL at 10:41

## 2019-07-31 RX ADMIN — Medication 10 MILLIGRAM(S): at 13:26

## 2019-07-31 RX ADMIN — MORPHINE SULFATE 4 MILLIGRAM(S): 50 CAPSULE, EXTENDED RELEASE ORAL at 13:46

## 2019-07-31 RX ADMIN — IOHEXOL 30 MILLILITER(S): 300 INJECTION, SOLUTION INTRAVENOUS at 10:42

## 2019-07-31 RX ADMIN — SODIUM CHLORIDE 1000 MILLILITER(S): 9 INJECTION INTRAMUSCULAR; INTRAVENOUS; SUBCUTANEOUS at 12:45

## 2019-07-31 RX ADMIN — SODIUM CHLORIDE 2000 MILLILITER(S): 9 INJECTION INTRAMUSCULAR; INTRAVENOUS; SUBCUTANEOUS at 11:29

## 2019-07-31 RX ADMIN — FAMOTIDINE 20 MILLIGRAM(S): 10 INJECTION INTRAVENOUS at 10:41

## 2019-07-31 RX ADMIN — SODIUM CHLORIDE 1000 MILLILITER(S): 9 INJECTION INTRAMUSCULAR; INTRAVENOUS; SUBCUTANEOUS at 12:33

## 2019-07-31 RX ADMIN — MORPHINE SULFATE 4 MILLIGRAM(S): 50 CAPSULE, EXTENDED RELEASE ORAL at 13:26

## 2019-07-31 NOTE — PATIENT PROFILE ADULT - IS THERE A SUSPICION OF ABUSE/NEGLIGENCE?
Subjective:      Adilene Smith is a 26 [facility] [address] [id number]    no

## 2019-07-31 NOTE — H&P ADULT - HISTORY OF PRESENT ILLNESS
57yo F w PMHx of HLD, SBO. PSHx CS x3. Presents to the ED  with multiple episodes of vomiting, abd pain, and abd distension starting last night.. Previous SBO was "a long time ago" and treated conservatively. Last BM in the AM but has not passed flatus since. Admits to belching. Denies fever, chills, hemoptysis, diarrhea, hematuria, sob, palpitations. NG tube inserted in ED w 100cc output on insertion.   H/O  X 3.

## 2019-07-31 NOTE — H&P ADULT - NSHPLABSRESULTS_GEN_ALL_CORE
15.6   11.12 )-----------( 314      ( 31 Jul 2019 10:50 )             45.3       07-31    141  |  105  |  13  ----------------------------<  155<H>  3.2<L>   |  25  |  0.97    Ca    9.8      31 Jul 2019 10:50  Mg     2.3     07-31    TPro  9.3<H>  /  Alb  4.9  /  TBili  1.1  /  DBili  x   /  AST  23  /  ALT  25  /  AlkPhos  97  07-31      PT/INR - ( 31 Jul 2019 10:50 )   PT: 10.9 sec;   INR: 0.97 ratio         PTT - ( 31 Jul 2019 10:50 )  PTT:24.8 sec      < from: CT Abdomen and Pelvis w/ Oral Cont and w/ IV Cont (07.31.19 @ 12:32) >    IMPRESSION:     Small bowel obstruction with the right pelvic transition point.            Lactate, Blood (07.31.19 @ 14:20)    Lactate, Blood: 1.3 mmol/L    Lactate, Blood (07.31.19 @ 10:50)    Lactate, Blood: 3.6 mmol/L

## 2019-07-31 NOTE — ED PROVIDER NOTE - OBJECTIVE STATEMENT
57yo female with pmh HL, gastritis psh: none presents with epigastric pain since last night with NV and weakness. Pt noted with abd distension states that last night. denies diarrhea, dysuria, fever, sob, palpitations.     ROS: No fever/chills. No photophobia/eye pain/changes in vision, No ear pain/sore throat/dysphagia, No chest pain/palpitations. No SOB/cough. +abdominal pain, +N/V, no D, no black/bloody bm. No dysuria/frequency/discharge, No headache. No Dizziness.  No rash.  No numbness/tingling/weakness. 59yo female with pmh HL, gastritis psh: CS x 3 presents with epigastric pain since last night with NV and weakness. Pt noted with abd distension states that last night. denies diarrhea, dysuria, fever, sob, palpitations. + BM this am.    ROS: No fever/chills. No photophobia/eye pain/changes in vision, No ear pain/sore throat/dysphagia, No chest pain/palpitations. No SOB/cough. +abdominal pain, +N/V, no D, no black/bloody bm. No dysuria/frequency/discharge, No headache. No Dizziness.  No rash.  No numbness/tingling/weakness.

## 2019-07-31 NOTE — ED PROVIDER NOTE - CLINICAL SUMMARY MEDICAL DECISION MAKING FREE TEXT BOX
pt with SBO, dr tirado aware, pt vomited po potassium, will give IV potassium. of note, pt reports has h/o SBO before managed conservatively.

## 2019-07-31 NOTE — ED ADULT NURSE NOTE - OBJECTIVE STATEMENT
58 year old female present with epigastric pain since last night with nausea and vomiting. Her last bowel movement this am. She has abdominal distention with tenderness

## 2019-07-31 NOTE — H&P ADULT - NSICDXPASTMEDICALHX_GEN_ALL_CORE_FT
PAST MEDICAL HISTORY:  Depression, unspecified depression type     Hyperlipidemia, unspecified hyperlipidemia type     SBO (small bowel obstruction)

## 2019-07-31 NOTE — ED PROVIDER NOTE - PHYSICAL EXAMINATION
Gen: Alert, + appears uncomfortable  Head: NC, AT, PERRL, normal lids/conjunctiva   ENT: Bilateral TM WNL, patent oropharynx without erythema/exudate, uvula midline  Neck: supple, no tenderness/meningismus  Pulm: Bilateral clear BS, normal resp effort  CV: RRR, no M/R/G, +dist pulses   Abd: soft, + tender epigastric/LUQ,, some distension, +BS, no guarding/rebound  Mskel: no edema/erythema/cyanosis   Skin: no rash, no bruising  Neuro: AAOx3, no sensory/motor deficits, CN 2-12 intact

## 2019-07-31 NOTE — H&P ADULT - PROBLEM SELECTOR PLAN 1
Admit  NPO  IV fluids: maintenance  AXR flat and upright in AM  NG tube to continuous low wall suction  strict I&Os  Trend electrolytes and lactate  Pain meds PRN- avoid narcotics  Surgery warranted if pt worsens Admit  NPO  IV fluids:   AXR flat and upright in AM  NG tube to continuous low wall suction  strict I&Os  Trend electrolytes and lactate  Pain meds PRN- avoid narcotics  Surgery warranted if pt worsens

## 2019-08-01 LAB
ANION GAP SERPL CALC-SCNC: 4 MMOL/L — LOW (ref 5–17)
BASOPHILS # BLD AUTO: 0.03 K/UL — SIGNIFICANT CHANGE UP (ref 0–0.2)
BASOPHILS NFR BLD AUTO: 0.4 % — SIGNIFICANT CHANGE UP (ref 0–2)
BLD GP AB SCN SERPL QL: SIGNIFICANT CHANGE UP
BUN SERPL-MCNC: 8 MG/DL — SIGNIFICANT CHANGE UP (ref 7–23)
CALCIUM SERPL-MCNC: 7.7 MG/DL — LOW (ref 8.5–10.1)
CHLORIDE SERPL-SCNC: 116 MMOL/L — HIGH (ref 96–108)
CO2 SERPL-SCNC: 25 MMOL/L — SIGNIFICANT CHANGE UP (ref 22–31)
CREAT SERPL-MCNC: 0.56 MG/DL — SIGNIFICANT CHANGE UP (ref 0.5–1.3)
EOSINOPHIL # BLD AUTO: 0.07 K/UL — SIGNIFICANT CHANGE UP (ref 0–0.5)
EOSINOPHIL NFR BLD AUTO: 0.9 % — SIGNIFICANT CHANGE UP (ref 0–6)
GLUCOSE SERPL-MCNC: 121 MG/DL — HIGH (ref 70–99)
HCT VFR BLD CALC: 34.5 % — SIGNIFICANT CHANGE UP (ref 34.5–45)
HCV AB S/CO SERPL IA: 0.08 S/CO — SIGNIFICANT CHANGE UP (ref 0–0.99)
HCV AB SERPL-IMP: SIGNIFICANT CHANGE UP
HGB BLD-MCNC: 11.2 G/DL — LOW (ref 11.5–15.5)
IMM GRANULOCYTES NFR BLD AUTO: 0.1 % — SIGNIFICANT CHANGE UP (ref 0–1.5)
LYMPHOCYTES # BLD AUTO: 2.04 K/UL — SIGNIFICANT CHANGE UP (ref 1–3.3)
LYMPHOCYTES # BLD AUTO: 27.5 % — SIGNIFICANT CHANGE UP (ref 13–44)
MAGNESIUM SERPL-MCNC: 2.6 MG/DL — SIGNIFICANT CHANGE UP (ref 1.6–2.6)
MCHC RBC-ENTMCNC: 29.7 PG — SIGNIFICANT CHANGE UP (ref 27–34)
MCHC RBC-ENTMCNC: 32.5 GM/DL — SIGNIFICANT CHANGE UP (ref 32–36)
MCV RBC AUTO: 91.5 FL — SIGNIFICANT CHANGE UP (ref 80–100)
MONOCYTES # BLD AUTO: 0.78 K/UL — SIGNIFICANT CHANGE UP (ref 0–0.9)
MONOCYTES NFR BLD AUTO: 10.5 % — SIGNIFICANT CHANGE UP (ref 2–14)
NEUTROPHILS # BLD AUTO: 4.49 K/UL — SIGNIFICANT CHANGE UP (ref 1.8–7.4)
NEUTROPHILS NFR BLD AUTO: 60.6 % — SIGNIFICANT CHANGE UP (ref 43–77)
NRBC # BLD: 0 /100 WBCS — SIGNIFICANT CHANGE UP (ref 0–0)
PHOSPHATE SERPL-MCNC: 2.2 MG/DL — LOW (ref 2.5–4.5)
PLATELET # BLD AUTO: 198 K/UL — SIGNIFICANT CHANGE UP (ref 150–400)
POTASSIUM SERPL-MCNC: 3.4 MMOL/L — LOW (ref 3.5–5.3)
POTASSIUM SERPL-SCNC: 3.4 MMOL/L — LOW (ref 3.5–5.3)
RBC # BLD: 3.77 M/UL — LOW (ref 3.8–5.2)
RBC # FLD: 13.9 % — SIGNIFICANT CHANGE UP (ref 10.3–14.5)
SODIUM SERPL-SCNC: 145 MMOL/L — SIGNIFICANT CHANGE UP (ref 135–145)
WBC # BLD: 7.42 K/UL — SIGNIFICANT CHANGE UP (ref 3.8–10.5)
WBC # FLD AUTO: 7.42 K/UL — SIGNIFICANT CHANGE UP (ref 3.8–10.5)

## 2019-08-01 PROCEDURE — 74019 RADEX ABDOMEN 2 VIEWS: CPT | Mod: 26

## 2019-08-01 RX ORDER — HEPARIN SODIUM 5000 [USP'U]/ML
5000 INJECTION INTRAVENOUS; SUBCUTANEOUS EVERY 12 HOURS
Refills: 0 | Status: DISCONTINUED | OUTPATIENT
Start: 2019-08-01 | End: 2019-08-02

## 2019-08-01 RX ORDER — POTASSIUM CHLORIDE 20 MEQ
10 PACKET (EA) ORAL
Refills: 0 | Status: COMPLETED | OUTPATIENT
Start: 2019-08-01 | End: 2019-08-01

## 2019-08-01 RX ORDER — MORPHINE SULFATE 50 MG/1
2 CAPSULE, EXTENDED RELEASE ORAL EVERY 6 HOURS
Refills: 0 | Status: DISCONTINUED | OUTPATIENT
Start: 2019-08-01 | End: 2019-08-02

## 2019-08-01 RX ORDER — POTASSIUM PHOSPHATE, MONOBASIC POTASSIUM PHOSPHATE, DIBASIC 236; 224 MG/ML; MG/ML
15 INJECTION, SOLUTION INTRAVENOUS ONCE
Refills: 0 | Status: COMPLETED | OUTPATIENT
Start: 2019-08-01 | End: 2019-08-01

## 2019-08-01 RX ADMIN — MORPHINE SULFATE 2 MILLIGRAM(S): 50 CAPSULE, EXTENDED RELEASE ORAL at 18:50

## 2019-08-01 RX ADMIN — FAMOTIDINE 20 MILLIGRAM(S): 10 INJECTION INTRAVENOUS at 11:44

## 2019-08-01 RX ADMIN — DEXTROSE MONOHYDRATE, SODIUM CHLORIDE, AND POTASSIUM CHLORIDE 125 MILLILITER(S): 50; .745; 4.5 INJECTION, SOLUTION INTRAVENOUS at 12:50

## 2019-08-01 RX ADMIN — DEXTROSE MONOHYDRATE, SODIUM CHLORIDE, AND POTASSIUM CHLORIDE 125 MILLILITER(S): 50; .745; 4.5 INJECTION, SOLUTION INTRAVENOUS at 06:19

## 2019-08-01 RX ADMIN — HEPARIN SODIUM 5000 UNIT(S): 5000 INJECTION INTRAVENOUS; SUBCUTANEOUS at 17:50

## 2019-08-01 RX ADMIN — Medication 100 MILLIEQUIVALENT(S): at 11:44

## 2019-08-01 RX ADMIN — MORPHINE SULFATE 2 MILLIGRAM(S): 50 CAPSULE, EXTENDED RELEASE ORAL at 13:04

## 2019-08-01 RX ADMIN — MORPHINE SULFATE 2 MILLIGRAM(S): 50 CAPSULE, EXTENDED RELEASE ORAL at 12:49

## 2019-08-01 RX ADMIN — MORPHINE SULFATE 2 MILLIGRAM(S): 50 CAPSULE, EXTENDED RELEASE ORAL at 19:05

## 2019-08-01 RX ADMIN — POTASSIUM PHOSPHATE, MONOBASIC POTASSIUM PHOSPHATE, DIBASIC 62.5 MILLIMOLE(S): 236; 224 INJECTION, SOLUTION INTRAVENOUS at 11:41

## 2019-08-01 NOTE — PROGRESS NOTE ADULT - SUBJECTIVE AND OBJECTIVE BOX
SURGERY PROGRESS HPI:  Pt seen and examined at bedside. Abdominal pain has significantly improved since yesterday. No acute events overnight. Pt tolerating NGT. Pt denies nausea and vomiting. Had diarrhea at 2am. +Flatus. Voiding well. Pt denies chest pain, SOB, dizziness, fever, chills. Ambulating to the bathroom.       Vital Signs Last 24 Hrs  T(C): 36.8 (01 Aug 2019 05:06), Max: 37.3 (2019 23:20)  T(F): 98.2 (01 Aug 2019 05:06), Max: 99.1 (2019 23:20)  HR: 73 (01 Aug 2019 05:06) (73 - 90)  BP: 111/66 (01 Aug 2019 05:06) (101/58 - 140/84)  BP(mean): --  RR: 17 (01 Aug 2019 05:06) (16 - 29)  SpO2: 94% (01 Aug 2019 05:06) (94% - 99%)      PHYSICAL EXAM:    GENERAL: NAD, NGT in place with gastric content 100cc/12hrs.   CHEST/LUNG: Clear to ausculation, bilaterally   HEART: RRR S1S2  ABDOMEN: non distended, +BS, soft, minimal lower abdominal tenderness, no guarding  EXTREMITIES:  calf soft, non tender b/l    I&O's Detail    2019 07:01  -  01 Aug 2019 06:49  --------------------------------------------------------  IN:    dextrose 5% + sodium chloride 0.9%: 125 mL    Sodium Chloride 0.9% IV Bolus: 2000 mL  Total IN: 2125 mL    OUT:    Nasoenteral Tube: 350 mL  Total OUT: 350 mL    Total NET: 1775 mL          LABS:                        15.6   11.12 )-----------( 314      ( 2019 10:50 )             45.3     -    141  |  105  |  13  ----------------------------<  155<H>  3.2<L>   |  25  |  0.97    Ca    9.8      2019 10:50  Mg     2.3         TPro  9.3<H>  /  Alb  4.9  /  TBili  1.1  /  DBili  x   /  AST  23  /  ALT  25  /  AlkPhos  97      PT/INR - ( 2019 10:50 )   PT: 10.9 sec;   INR: 0.97 ratio         PTT - ( 2019 10:50 )  PTT:24.8 sec  Urinalysis Basic - ( 2019 15:03 )    Color: Yellow / Appearance: Clear / S.005 / pH: x  Gluc: x / Ketone: Small  / Bili: Negative / Urobili: Negative mg/dL   Blood: x / Protein: 30 mg/dL / Nitrite: Negative   Leuk Esterase: Negative / RBC: 3-5 /HPF / WBC 0-2   Sq Epi: x / Non Sq Epi: Occasional / Bacteria: x      Assessment: 58F admitted with SBO treated conservatively with NGT decompression. Had diarrhea this morning.     Plan:  -NGT clamp trial, NPO, IVF  -f/u AM AXR  -continue DVT prophylaxis, OOB, Ambulating  -f/u labs  -will discuss pt with surgical attending SURGERY PROGRESS HPI:  Pt seen and examined at bedside. Abdominal pain has significantly improved since yesterday. No acute events overnight. Pt tolerating NGT. Pt denies nausea and vomiting. Had diarrhea at 2am. +Flatus. Voiding well. Pt denies chest pain, SOB, dizziness, fever, chills. Ambulating to the bathroom.       Vital Signs Last 24 Hrs  T(C): 36.8 (01 Aug 2019 05:06), Max: 37.3 (2019 23:20)  T(F): 98.2 (01 Aug 2019 05:06), Max: 99.1 (2019 23:20)  HR: 73 (01 Aug 2019 05:06) (73 - 90)  BP: 111/66 (01 Aug 2019 05:06) (101/58 - 140/84)  BP(mean): --  RR: 17 (01 Aug 2019 05:06) (16 - 29)  SpO2: 94% (01 Aug 2019 05:06) (94% - 99%)      PHYSICAL EXAM:    GENERAL: NAD, NGT in place with gastric content 100cc/12hrs.   CHEST/LUNG: Clear to ausculation, bilaterally   HEART: RRR S1S2  ABDOMEN: moderate distended, +Hypoactive BS, soft, minimal lower abdominal tenderness, no guarding  EXTREMITIES:  calf soft, non tender b/l    I&O's Detail    2019 07:01  -  01 Aug 2019 06:49  --------------------------------------------------------  IN:    dextrose 5% + sodium chloride 0.9%: 125 mL    Sodium Chloride 0.9% IV Bolus: 2000 mL  Total IN: 2125 mL    OUT:    Nasoenteral Tube: 350 mL  Total OUT: 350 mL    Total NET: 1775 mL          LABS:                        15.6   11.12 )-----------( 314      ( 2019 10:50 )             45.3         141  |  105  |  13  ----------------------------<  155<H>           K+  3.4    8-01  3.2<L>   |  25  |  0.97    Ca    9.8      2019 10:50  Mg     2.3         TPro  9.3<H>  /  Alb  4.9  /  TBili  1.1  /  DBili  x   /  AST  23  /  ALT  25  /  AlkPhos  97      PT/INR - ( 2019 10:50 )   PT: 10.9 sec;   INR: 0.97 ratio         PTT - ( 2019 10:50 )  PTT:24.8 sec  Urinalysis Basic - ( 2019 15:03 )    Color: Yellow / Appearance: Clear / S.005 / pH: x  Gluc: x / Ketone: Small  / Bili: Negative / Urobili: Negative mg/dL   Blood: x / Protein: 30 mg/dL / Nitrite: Negative   Leuk Esterase: Negative / RBC: 3-5 /HPF / WBC 0-2   Sq Epi: x / Non Sq Epi: Occasional / Bacteria: x      Assessment:     58F admitted with SBO treated conservatively with NGT decompression.   Had diarrhea this morning. Abdomen soft.  Hypokalemia    Plan:  -NGT clamp trial, NPO, IVF  -f/u AM AXR  -continue DVT prophylaxis, OOB, Ambulating  -f/u labs  -will discuss pt with surgical attending

## 2019-08-02 ENCOUNTER — TRANSCRIPTION ENCOUNTER (OUTPATIENT)
Age: 59
End: 2019-08-02

## 2019-08-02 VITALS
SYSTOLIC BLOOD PRESSURE: 105 MMHG | DIASTOLIC BLOOD PRESSURE: 74 MMHG | TEMPERATURE: 98 F | RESPIRATION RATE: 17 BRPM | OXYGEN SATURATION: 98 % | HEART RATE: 67 BPM

## 2019-08-02 LAB
ANION GAP SERPL CALC-SCNC: 3 MMOL/L — LOW (ref 5–17)
BUN SERPL-MCNC: 2 MG/DL — LOW (ref 7–23)
CALCIUM SERPL-MCNC: 7.7 MG/DL — LOW (ref 8.5–10.1)
CHLORIDE SERPL-SCNC: 113 MMOL/L — HIGH (ref 96–108)
CO2 SERPL-SCNC: 28 MMOL/L — SIGNIFICANT CHANGE UP (ref 22–31)
CREAT SERPL-MCNC: 0.58 MG/DL — SIGNIFICANT CHANGE UP (ref 0.5–1.3)
GLUCOSE SERPL-MCNC: 105 MG/DL — HIGH (ref 70–99)
HCT VFR BLD CALC: 35.9 % — SIGNIFICANT CHANGE UP (ref 34.5–45)
HGB BLD-MCNC: 11.4 G/DL — LOW (ref 11.5–15.5)
MAGNESIUM SERPL-MCNC: 2 MG/DL — SIGNIFICANT CHANGE UP (ref 1.6–2.6)
MCHC RBC-ENTMCNC: 29.4 PG — SIGNIFICANT CHANGE UP (ref 27–34)
MCHC RBC-ENTMCNC: 31.8 GM/DL — LOW (ref 32–36)
MCV RBC AUTO: 92.5 FL — SIGNIFICANT CHANGE UP (ref 80–100)
NRBC # BLD: 0 /100 WBCS — SIGNIFICANT CHANGE UP (ref 0–0)
PHOSPHATE SERPL-MCNC: 2.3 MG/DL — LOW (ref 2.5–4.5)
PLATELET # BLD AUTO: 188 K/UL — SIGNIFICANT CHANGE UP (ref 150–400)
POTASSIUM SERPL-MCNC: 4.1 MMOL/L — SIGNIFICANT CHANGE UP (ref 3.5–5.3)
POTASSIUM SERPL-SCNC: 4.1 MMOL/L — SIGNIFICANT CHANGE UP (ref 3.5–5.3)
RBC # BLD: 3.88 M/UL — SIGNIFICANT CHANGE UP (ref 3.8–5.2)
RBC # FLD: 13.8 % — SIGNIFICANT CHANGE UP (ref 10.3–14.5)
SODIUM SERPL-SCNC: 144 MMOL/L — SIGNIFICANT CHANGE UP (ref 135–145)
WBC # BLD: 5.24 K/UL — SIGNIFICANT CHANGE UP (ref 3.8–10.5)
WBC # FLD AUTO: 5.24 K/UL — SIGNIFICANT CHANGE UP (ref 3.8–10.5)

## 2019-08-02 RX ORDER — SODIUM,POTASSIUM PHOSPHATES 278-250MG
1 POWDER IN PACKET (EA) ORAL
Refills: 0 | Status: DISCONTINUED | OUTPATIENT
Start: 2019-08-02 | End: 2019-08-02

## 2019-08-02 RX ORDER — ACETAMINOPHEN 500 MG
650 TABLET ORAL ONCE
Refills: 0 | Status: COMPLETED | OUTPATIENT
Start: 2019-08-02 | End: 2019-08-02

## 2019-08-02 RX ADMIN — FAMOTIDINE 20 MILLIGRAM(S): 10 INJECTION INTRAVENOUS at 11:22

## 2019-08-02 RX ADMIN — DEXTROSE MONOHYDRATE, SODIUM CHLORIDE, AND POTASSIUM CHLORIDE 125 MILLILITER(S): 50; .745; 4.5 INJECTION, SOLUTION INTRAVENOUS at 11:24

## 2019-08-02 RX ADMIN — Medication 650 MILLIGRAM(S): at 07:30

## 2019-08-02 RX ADMIN — DEXTROSE MONOHYDRATE, SODIUM CHLORIDE, AND POTASSIUM CHLORIDE 125 MILLILITER(S): 50; .745; 4.5 INJECTION, SOLUTION INTRAVENOUS at 06:30

## 2019-08-02 RX ADMIN — HEPARIN SODIUM 5000 UNIT(S): 5000 INJECTION INTRAVENOUS; SUBCUTANEOUS at 06:30

## 2019-08-02 RX ADMIN — Medication 650 MILLIGRAM(S): at 06:31

## 2019-08-02 RX ADMIN — Medication 1 TABLET(S): at 12:04

## 2019-08-02 RX ADMIN — Medication 1 TABLET(S): at 17:49

## 2019-08-02 NOTE — PROGRESS NOTE ADULT - ATTENDING COMMENTS
I examined patient, plan discussed.  Symptoms improving, Abdomen soft.  Patient had lose BM this am.    Plan:  D/C NGT.  Start clear liquids diet.  Replace K+  repeat AXR in am
I examined patient and plan discussed.    Pain , vomiting resolved. Tolerating diet.  Abdomen soft no distended, no tender    Advance diet.  D/C plan:

## 2019-08-02 NOTE — DISCHARGE NOTE NURSING/CASE MANAGEMENT/SOCIAL WORK - NSDCDPATPORTLINK_GEN_ALL_CORE
You can access the [employer] [facility] Portal, offered by [facility], by registering with the following website: [email or web address]

## 2019-08-02 NOTE — CHART NOTE - NSCHARTNOTEFT_GEN_A_CORE
Pt was seen for follow up earlier today, states she feels much better  Tolerating clear liquid diet.  No n/v. +BMs and flatus    Abd is soft and nontender with no distention and active bowel sounds.    Labs Reviewed:                        11.4   5.24  )-----------( 188      ( 02 Aug 2019 08:04 )             35.9   08-02    144  |  113<H>  |  2<L>  ----------------------------<  105<H>  4.1   |  28  |  0.58    Ca    7.7<L>      02 Aug 2019 08:04  Phos  2.3     08-02  Mg     2.0     08-02    Hypophosphatemia repleted.  As discussed with Dr Saini, will advance to regular diet  Discharge planning

## 2019-08-02 NOTE — PROGRESS NOTE ADULT - SUBJECTIVE AND OBJECTIVE BOX
Patient seen and examined bedside resting comfortably. c/o HA and as per pt, she has chronic migraines at home.  +flatus. Denies BM since yesterday. Voiding well. Doing well since d/c NGT. Tolerating clear liquid diet, no n/v. Denies abd pain at rest.  Denies any other complaints. No acute events overnight.  Denies fever, chills, chest pain, dyspnea, cough.    T(F): 98.7 (08-02-19 @ 05:40), Max: 99.8 (08-01-19 @ 17:09)  HR: 65 (08-02-19 @ 05:40) (65 - 83)  BP: 114/67 (08-02-19 @ 05:40) (114/67 - 134/84)  RR: 16 (08-02-19 @ 05:40) (16 - 17)  SpO2: 99% (08-02-19 @ 05:40) (96% - 100%)  CAPILLARY BLOOD GLUCOSE      PHYSICAL EXAM:  General: NAD, WDWN  CV: +S1+S2 regular rate and rhythm  Lung: clear to ausculation bilaterally, respirations nonlabored, good inspiratory effort  Abdomen: soft, mildly tender on palpation. +BS, non distended  Extremities: no pedal edema or calf tenderness noted     LABS:                        11.2   7.42  )-----------( 198      ( 01 Aug 2019 06:27 )             34.5     08-01    145  |  116<H>  |  8   ----------------------------<  121<H>  3.4<L>   |  25  |  0.56    Ca    7.7<L>      01 Aug 2019 06:27  Phos  2.2     08-01  Mg     2.6     08-01    TPro  9.3<H>  /  Alb  4.9  /  TBili  1.1  /  DBili  x   /  AST  23  /  ALT  25  /  AlkPhos  97  07-31    PT/INR - ( 31 Jul 2019 10:50 )   PT: 10.9 sec;   INR: 0.97 ratio         PTT - ( 31 Jul 2019 10:50 )  PTT:24.8 sec  I&O's Detail    31 Jul 2019 07:01  -  01 Aug 2019 07:00  --------------------------------------------------------  IN:    dextrose 5% + sodium chloride 0.9%: 125 mL    dextrose 5% + sodium chloride 0.9% with potassium chloride 20 mEq/L: 1500 mL    Sodium Chloride 0.9% IV Bolus: 2000 mL  Total IN: 3625 mL    OUT:    Nasoenteral Tube: 350 mL  Total OUT: 350 mL    Total NET: 3275 mL      01 Aug 2019 07:01  -  02 Aug 2019 06:11  --------------------------------------------------------  IN:    Oral Fluid: 480 mL  Total IN: 480 mL    OUT:  Total OUT: 0 mL    Total NET: 480 mL      Impression: 58F w/ PMH of HLD, depression admitted with SBO treated conservatively with NGT decompression, now d/c NGT  AXR 8/1 - decompressed colon, r/o distended, fluid-filled SB    Plan:  -Continue clear liquid diet for now  -Pain management prn  -Continue DVT prophylaxis, OOB, ambulate  -Will discuss pt with surgical attending  -f/u repeat AXR  -f/u labs Patient seen and examined bedside resting comfortably. c/o HA and as per pt, she has chronic migraines at home.  +flatus. Denies BM since yesterday. Voiding well. Doing well since d/c NGT. Tolerating clear liquid diet, no n/v. Denies abd pain at rest.  Denies any other complaints. No acute events overnight.  Denies fever, chills, chest pain, dyspnea, cough.    T(F): 98.7 (08-02-19 @ 05:40), Max: 99.8 (08-01-19 @ 17:09)  HR: 65 (08-02-19 @ 05:40) (65 - 83)  BP: 114/67 (08-02-19 @ 05:40) (114/67 - 134/84)  RR: 16 (08-02-19 @ 05:40) (16 - 17)  SpO2: 99% (08-02-19 @ 05:40) (96% - 100%)  CAPILLARY BLOOD GLUCOSE      PHYSICAL EXAM:  General: NAD, WDWN  CV: +S1+S2 regular rate and rhythm  Lung: clear to ausculation bilaterally, respirations nonlabored, good inspiratory effort  Abdomen: soft, mildly tender on palpation. +BS, non distended  Extremities: no pedal edema or calf tenderness noted     LABS:                        11.2   7.42  )-----------( 198      ( 01 Aug 2019 06:27 )             34.5     08-01    145  |  116<H>  |  8   ----------------------------<  121<H>  3.4<L>   |  25  |  0.56    Ca    7.7<L>      01 Aug 2019 06:27  Phos  2.2     08-01  Mg     2.6     08-01    TPro  9.3<H>  /  Alb  4.9  /  TBili  1.1  /  DBili  x   /  AST  23  /  ALT  25  /  AlkPhos  97  07-31    PT/INR - ( 31 Jul 2019 10:50 )   PT: 10.9 sec;   INR: 0.97 ratio         PTT - ( 31 Jul 2019 10:50 )  PTT:24.8 sec  I&O's Detail    31 Jul 2019 07:01  -  01 Aug 2019 07:00  --------------------------------------------------------  IN:    dextrose 5% + sodium chloride 0.9%: 125 mL    dextrose 5% + sodium chloride 0.9% with potassium chloride 20 mEq/L: 1500 mL    Sodium Chloride 0.9% IV Bolus: 2000 mL  Total IN: 3625 mL    OUT:    Total NET: 3275 mL      01 Aug 2019 07:01  -  02 Aug 2019 06:11  --------------------------------------------------------  IN:    Oral Fluid: 480 mL  Total IN: 480 mL    OUT:  Total OUT: 0 mL    Total NET: 480 mL      Impression: 58F w/ PMH of HLD, depression admitted with SBO treated conservatively with NGT decompression, now resolving    Plan:  -Continue clear liquid diet   -Pain management prn  -f/u labs  -Continue DVT prophylaxis, OOB, ambulate  -Will discuss pt with surgical attending Patient seen and examined bedside resting comfortably. c/o HA and as per pt, she has chronic migraines at home.  +flatus. Denies BM since yesterday. Voiding well. Doing well since d/c NGT. Tolerating clear liquid diet, no n/v. Denies abd pain at rest.  Denies any other complaints. No acute events overnight.  Denies fever, chills, chest pain, dyspnea, cough.    T(F): 98.7 (08-02-19 @ 05:40), Max: 99.8 (08-01-19 @ 17:09)  HR: 65 (08-02-19 @ 05:40) (65 - 83)  BP: 114/67 (08-02-19 @ 05:40) (114/67 - 134/84)  RR: 16 (08-02-19 @ 05:40) (16 - 17)  SpO2: 99% (08-02-19 @ 05:40) (96% - 100%)  CAPILLARY BLOOD GLUCOSE      PHYSICAL EXAM:  General: NAD, WDWN  CV: +S1+S2 regular rate and rhythm  Lung: clear to ausculation bilaterally, respirations nonlabored, good inspiratory effort  Abdomen: soft, mildly tender on palpation. +BS, non distended  Extremities: no pedal edema or calf tenderness noted     LABS:                        11.2   7.42  )-----------( 198      ( 01 Aug 2019 06:27 )             34.5     08-01    145  |  116<H>  |  8   ----------------------------<  121<H>  3.4<L>   |  25  |  0.56    Ca    7.7<L>      01 Aug 2019 06:27  Phos  2.2     08-01  Mg     2.6     08-01    TPro  9.3<H>  /  Alb  4.9  /  TBili  1.1  /  DBili  x   /  AST  23  /  ALT  25  /  AlkPhos  97  07-31    PT/INR - ( 31 Jul 2019 10:50 )   PT: 10.9 sec;   INR: 0.97 ratio         PTT - ( 31 Jul 2019 10:50 )  PTT:24.8 sec  I&O's Detail    31 Jul 2019 07:01  -  01 Aug 2019 07:00  --------------------------------------------------------  IN:    dextrose 5% + sodium chloride 0.9%: 125 mL    dextrose 5% + sodium chloride 0.9% with potassium chloride 20 mEq/L: 1500 mL    Sodium Chloride 0.9% IV Bolus: 2000 mL  Total IN: 3625 mL    OUT:    Total NET: 3275 mL      01 Aug 2019 07:01  -  02 Aug 2019 06:11  --------------------------------------------------------  IN:    Oral Fluid: 480 mL  Total IN: 480 mL    OUT:  Total OUT: 0 mL    Total NET: 480 mL      Impression:     58F w/ PMH of HLD, depression admitted with SBO treated conservatively with NGT decompression, now resolving SBO    Plan:  -Advance diet.  -Pain management prn  -f/u labs  -Continue DVT prophylaxis, OOB, ambulate  -Will discuss pt with surgical attending

## 2019-08-02 NOTE — DISCHARGE NOTE PROVIDER - HOSPITAL COURSE
HPI:    59yo F w PMHx of HLD, SBO. PSHx CS x3. Presents to the ED  with multiple episodes of vomiting, abd pain, and abd distension starting last night.. Previous SBO was "a long time ago" and treated conservatively. Last BM in the AM but has not passed flatus since. Admits to belching. Denies fever, chills, hemoptysis, diarrhea, hematuria, sob, palpitations. NG tube inserted in ED w 100cc output on insertion.     H/O  X 3. (2019 14:52)    Pt was admitted with SBO and managed conservatively. Bowel function returned and diet was advanced and tolerated. She was stable for d/c on 19. HPI:    57yo F w PMHx of HLD, SBO. PSHx CS x3. Presents to the ED  with multiple episodes of vomiting, abd pain, and abd distension starting last night.. Previous SBO was "a long time ago" and treated conservatively. Last BM in the AM but has not passed flatus since. Admits to belching. Denies fever, chills, hemoptysis, diarrhea, hematuria, sob, palpitations. NG tube inserted in ED w 100cc output on insertion.     H/O  X 3. (2019 14:52)    Pt was admitted with SBO and managed conservatively, with NGT and NPO, symptoms improved,  Bowel function returned and diet was advanced and tolerated. She was stable for d/c on 19.

## 2019-08-02 NOTE — DISCHARGE NOTE PROVIDER - CARE PROVIDER_API CALL
Moy Saini (DO)  Surgery  [address] [place] [address]  Phone: [phone]  Fax: [phone]  Follow Up Time:

## 2019-08-02 NOTE — DISCHARGE NOTE PROVIDER - NSDCFUSCHEDAPPT_GEN_ALL_CORE_FT
HARSHIL BUENO ; 08/14/2019 ; [facility] [address] HARSHIL BUENO ; 08/14/2019 ; [facility] [address]

## 2019-08-08 DIAGNOSIS — E87.6 HYPOKALEMIA: ICD-10-CM

## 2019-08-08 DIAGNOSIS — K56.609 UNSPECIFIED INTESTINAL OBSTRUCTION, UNSPECIFIED AS TO PARTIAL VERSUS COMPLETE OBSTRUCTION: ICD-10-CM

## 2019-08-08 DIAGNOSIS — E87.2 ACIDOSIS: ICD-10-CM

## 2019-08-08 DIAGNOSIS — R10.9 UNSPECIFIED ABDOMINAL PAIN: ICD-10-CM

## 2019-08-08 DIAGNOSIS — G43.909 MIGRAINE, UNSPECIFIED, NOT INTRACTABLE, WITHOUT STATUS MIGRAINOSUS: ICD-10-CM

## 2019-08-08 DIAGNOSIS — E78.5 HYPERLIPIDEMIA, UNSPECIFIED: ICD-10-CM

## 2019-08-08 DIAGNOSIS — F32.9 MAJOR DEPRESSIVE DISORDER, SINGLE EPISODE, UNSPECIFIED: ICD-10-CM

## 2019-08-08 DIAGNOSIS — E83.39 OTHER DISORDERS OF PHOSPHORUS METABOLISM: ICD-10-CM

## 2019-08-14 ENCOUNTER — APPOINTMENT (OUTPATIENT)
Dept: INTERNAL MEDICINE | Facility: CLINIC | Age: 59
End: 2019-08-14

## 2020-09-16 PROBLEM — K56.609 UNSPECIFIED INTESTINAL OBSTRUCTION, UNSPECIFIED AS TO PARTIAL VERSUS COMPLETE OBSTRUCTION: Chronic | Status: ACTIVE | Noted: 2019-07-31

## 2020-10-14 ENCOUNTER — APPOINTMENT (OUTPATIENT)
Dept: FAMILY MEDICINE | Facility: CLINIC | Age: 60
End: 2020-10-14
Payer: MEDICAID

## 2020-10-14 VITALS
WEIGHT: 128 LBS | HEART RATE: 74 BPM | HEIGHT: 62 IN | BODY MASS INDEX: 23.55 KG/M2 | SYSTOLIC BLOOD PRESSURE: 122 MMHG | DIASTOLIC BLOOD PRESSURE: 72 MMHG

## 2020-10-14 PROCEDURE — 36415 COLL VENOUS BLD VENIPUNCTURE: CPT

## 2020-10-14 PROCEDURE — 99396 PREV VISIT EST AGE 40-64: CPT | Mod: 25

## 2020-10-14 NOTE — PHYSICAL EXAM
[No Acute Distress] : no acute distress [Well Nourished] : well nourished [Well Developed] : well developed [Well-Appearing] : well-appearing [PERRL] : pupils equal round and reactive to light [Normal Sclera/Conjunctiva] : normal sclera/conjunctiva [EOMI] : extraocular movements intact [Normal Outer Ear/Nose] : the outer ears and nose were normal in appearance [Normal TMs] : both tympanic membranes were normal [No JVD] : no jugular venous distention [No Lymphadenopathy] : no lymphadenopathy [Supple] : supple [Thyroid Normal, No Nodules] : the thyroid was normal and there were no nodules present [No Respiratory Distress] : no respiratory distress  [No Accessory Muscle Use] : no accessory muscle use [Clear to Auscultation] : lungs were clear to auscultation bilaterally [Normal Rate] : normal rate  [Regular Rhythm] : with a regular rhythm [Normal S1, S2] : normal S1 and S2 [No Murmur] : no murmur heard [No Carotid Bruits] : no carotid bruits [No Abdominal Bruit] : a ~M bruit was not heard ~T in the abdomen [No Varicosities] : no varicosities [No Edema] : there was no peripheral edema [No Palpable Aorta] : no palpable aorta [No Extremity Clubbing/Cyanosis] : no extremity clubbing/cyanosis [Normal Appearance] : normal in appearance [No Nipple Discharge] : no nipple discharge [No Axillary Lymphadenopathy] : no axillary lymphadenopathy [Soft] : abdomen soft [Non Tender] : non-tender [Non-distended] : non-distended [No Masses] : no abdominal mass palpated [No HSM] : no HSM [Normal Bowel Sounds] : normal bowel sounds [Normal Supraclavicular Nodes] : no supraclavicular lymphadenopathy [Normal Axillary Nodes] : no axillary lymphadenopathy [Normal Posterior Cervical Nodes] : no posterior cervical lymphadenopathy [Normal Anterior Cervical Nodes] : no anterior cervical lymphadenopathy [No CVA Tenderness] : no CVA  tenderness [No Spinal Tenderness] : no spinal tenderness [No Joint Swelling] : no joint swelling [No Rash] : no rash [Grossly Normal Strength/Tone] : grossly normal strength/tone [Coordination Grossly Intact] : coordination grossly intact [No Focal Deficits] : no focal deficits [Normal Gait] : normal gait [Alert and Oriented x3] : oriented to person, place, and time [Normal Affect] : the affect was normal [Normal Insight/Judgement] : insight and judgment were intact

## 2020-10-14 NOTE — HEALTH RISK ASSESSMENT
[Good] : ~his/her~  mood as  good [] : No [No] : No [No falls in past year] : Patient reported no falls in the past year [0] : 2) Feeling down, depressed, or hopeless: Not at all (0) [HIV test declined] : HIV test declined [Hepatitis C test declined] : Hepatitis C test declined [Change in mental status noted] : No change in mental status noted [Language] : denies difficulty with language [Handling Complex Tasks] : denies difficulty handling complex tasks [With Family] : lives with family [# of Members in Household ___] :  household currently consist of [unfilled] member(s) [Employed] : employed [Single] : single [Sexually Active] : not sexually active [Feels Safe at Home] : Feels safe at home [High Risk Behavior] : no high risk behavior [Fully functional (bathing, dressing, toileting, transferring, walking, feeding)] : Fully functional (bathing, dressing, toileting, transferring, walking, feeding) [Fully functional (using the telephone, shopping, preparing meals, housekeeping, doing laundry, using] : Fully functional and needs no help or supervision to perform IADLs (using the telephone, shopping, preparing meals, housekeeping, doing laundry, using transportation, managing medications and managing finances) [Reports changes in hearing] : Reports no changes in hearing [Reports changes in vision] : Reports no changes in vision [Reports changes in dental health] : Reports no changes in dental health [Seat Belt] :  uses seat belt [Travel to Developing Areas] : does not  travel to developing areas [de-identified] : brother, sister in law [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

## 2020-10-15 LAB
25(OH)D3 SERPL-MCNC: 31.4 NG/ML
ALBUMIN SERPL ELPH-MCNC: 4.7 G/DL
ALP BLD-CCNC: 86 U/L
ALT SERPL-CCNC: 35 U/L
ANION GAP SERPL CALC-SCNC: 11 MMOL/L
APPEARANCE: CLEAR
AST SERPL-CCNC: 34 U/L
BASOPHILS # BLD AUTO: 0.03 K/UL
BASOPHILS NFR BLD AUTO: 0.8 %
BILIRUB SERPL-MCNC: 0.7 MG/DL
BILIRUBIN URINE: NEGATIVE
BLOOD URINE: NEGATIVE
BUN SERPL-MCNC: 11 MG/DL
CALCIUM SERPL-MCNC: 9.5 MG/DL
CHLORIDE SERPL-SCNC: 105 MMOL/L
CHOLEST SERPL-MCNC: 288 MG/DL
CHOLEST/HDLC SERPL: 5.5 RATIO
CO2 SERPL-SCNC: 24 MMOL/L
COLOR: YELLOW
CREAT SERPL-MCNC: 0.66 MG/DL
EOSINOPHIL # BLD AUTO: 0.06 K/UL
EOSINOPHIL NFR BLD AUTO: 1.5 %
ESTIMATED AVERAGE GLUCOSE: 123 MG/DL
FERRITIN SERPL-MCNC: 103 NG/ML
FOLATE SERPL-MCNC: >20 NG/ML
GLUCOSE QUALITATIVE U: NEGATIVE
GLUCOSE SERPL-MCNC: 96 MG/DL
HBA1C MFR BLD HPLC: 5.9 %
HCT VFR BLD CALC: 39.8 %
HDLC SERPL-MCNC: 52 MG/DL
HGB BLD-MCNC: 12.9 G/DL
IMM GRANULOCYTES NFR BLD AUTO: 0.3 %
IRON SATN MFR SERPL: 41 %
IRON SERPL-MCNC: 124 UG/DL
KETONES URINE: NEGATIVE
LDLC SERPL CALC-MCNC: 182 MG/DL
LEUKOCYTE ESTERASE URINE: NEGATIVE
LYMPHOCYTES # BLD AUTO: 1.69 K/UL
LYMPHOCYTES NFR BLD AUTO: 43.2 %
MAN DIFF?: NORMAL
MCHC RBC-ENTMCNC: 29.5 PG
MCHC RBC-ENTMCNC: 32.4 GM/DL
MCV RBC AUTO: 91.1 FL
MONOCYTES # BLD AUTO: 0.36 K/UL
MONOCYTES NFR BLD AUTO: 9.2 %
NEUTROPHILS # BLD AUTO: 1.76 K/UL
NEUTROPHILS NFR BLD AUTO: 45 %
NITRITE URINE: NEGATIVE
PH URINE: 6
PLATELET # BLD AUTO: 214 K/UL
POTASSIUM SERPL-SCNC: 4.6 MMOL/L
PROT SERPL-MCNC: 7.2 G/DL
PROTEIN URINE: NEGATIVE
RBC # BLD: 4.37 M/UL
RBC # FLD: 13.2 %
SARS-COV-2 IGG SERPL IA-ACNC: <3.8 AU/ML
SARS-COV-2 IGG SERPL QL IA: NEGATIVE
SODIUM SERPL-SCNC: 140 MMOL/L
SPECIFIC GRAVITY URINE: 1.02
TIBC SERPL-MCNC: 306 UG/DL
TRIGL SERPL-MCNC: 265 MG/DL
TSH SERPL-ACNC: 0.86 UIU/ML
UIBC SERPL-MCNC: 182 UG/DL
UROBILINOGEN URINE: NORMAL
VIT B12 SERPL-MCNC: 731 PG/ML
WBC # FLD AUTO: 3.91 K/UL

## 2021-04-01 ENCOUNTER — APPOINTMENT (OUTPATIENT)
Dept: FAMILY MEDICINE | Facility: CLINIC | Age: 61
End: 2021-04-01
Payer: MEDICAID

## 2021-04-01 ENCOUNTER — NON-APPOINTMENT (OUTPATIENT)
Age: 61
End: 2021-04-01

## 2021-04-01 VITALS
WEIGHT: 130 LBS | HEIGHT: 62 IN | SYSTOLIC BLOOD PRESSURE: 120 MMHG | TEMPERATURE: 97.7 F | BODY MASS INDEX: 23.92 KG/M2 | DIASTOLIC BLOOD PRESSURE: 80 MMHG | OXYGEN SATURATION: 98 % | HEART RATE: 72 BPM

## 2021-04-01 DIAGNOSIS — E04.1 NONTOXIC SINGLE THYROID NODULE: ICD-10-CM

## 2021-04-01 LAB
25(OH)D3 SERPL-MCNC: 25.7 NG/ML
ALBUMIN SERPL ELPH-MCNC: 4.5 G/DL
ALP BLD-CCNC: 98 U/L
ALT SERPL-CCNC: 28 U/L
ANION GAP SERPL CALC-SCNC: 10 MMOL/L
AST SERPL-CCNC: 27 U/L
BASOPHILS # BLD AUTO: 0.03 K/UL
BASOPHILS NFR BLD AUTO: 0.9 %
BILIRUB SERPL-MCNC: 0.6 MG/DL
BUN SERPL-MCNC: 11 MG/DL
CALCIUM SERPL-MCNC: 9.8 MG/DL
CHLORIDE SERPL-SCNC: 106 MMOL/L
CHOLEST SERPL-MCNC: 269 MG/DL
CO2 SERPL-SCNC: 25 MMOL/L
COVID-19 NUCLEOCAPSID  GAM ANTIBODY INTERPRETATION: POSITIVE
CREAT SERPL-MCNC: 0.75 MG/DL
CRP SERPL-MCNC: 3 MG/L
DEPRECATED D DIMER PPP IA-ACNC: 172 NG/ML DDU
EOSINOPHIL # BLD AUTO: 0.04 K/UL
EOSINOPHIL NFR BLD AUTO: 1.1 %
ERYTHROCYTE [SEDIMENTATION RATE] IN BLOOD BY WESTERGREN METHOD: 14 MM/HR
ESTIMATED AVERAGE GLUCOSE: 120 MG/DL
FERRITIN SERPL-MCNC: 124 NG/ML
GLUCOSE SERPL-MCNC: 105 MG/DL
HBA1C MFR BLD HPLC: 5.8 %
HCT VFR BLD CALC: 41 %
HDLC SERPL-MCNC: 55 MG/DL
HGB BLD-MCNC: 13.2 G/DL
IMM GRANULOCYTES NFR BLD AUTO: 0.3 %
LDLC SERPL CALC-MCNC: 174 MG/DL
LYMPHOCYTES # BLD AUTO: 1.51 K/UL
LYMPHOCYTES NFR BLD AUTO: 43.1 %
MAN DIFF?: NORMAL
MCHC RBC-ENTMCNC: 28.9 PG
MCHC RBC-ENTMCNC: 32.2 GM/DL
MCV RBC AUTO: 89.7 FL
MONOCYTES # BLD AUTO: 0.27 K/UL
MONOCYTES NFR BLD AUTO: 7.7 %
NEUTROPHILS # BLD AUTO: 1.64 K/UL
NEUTROPHILS NFR BLD AUTO: 46.9 %
NONHDLC SERPL-MCNC: 213 MG/DL
PLATELET # BLD AUTO: 263 K/UL
POTASSIUM SERPL-SCNC: 4.8 MMOL/L
PROT SERPL-MCNC: 7.8 G/DL
RBC # BLD: 4.57 M/UL
RBC # FLD: 13.3 %
SARS-COV-2 AB SERPL QL IA: 21.5 INDEX
SODIUM SERPL-SCNC: 141 MMOL/L
TRIGL SERPL-MCNC: 195 MG/DL
TSH SERPL-ACNC: 0.99 UIU/ML
WBC # FLD AUTO: 3.5 K/UL

## 2021-04-01 PROCEDURE — 93000 ELECTROCARDIOGRAM COMPLETE: CPT

## 2021-04-01 PROCEDURE — 99072 ADDL SUPL MATRL&STAF TM PHE: CPT

## 2021-04-01 PROCEDURE — 99215 OFFICE O/P EST HI 40 MIN: CPT | Mod: 25

## 2021-04-01 NOTE — PHYSICAL EXAM
[No Acute Distress] : no acute distress [Well Nourished] : well nourished [Normal Sclera/Conjunctiva] : normal sclera/conjunctiva [EOMI] : extraocular movements intact [Normal Outer Ear/Nose] : the outer ears and nose were normal in appearance [No JVD] : no jugular venous distention [Normal] : normal rate, regular rhythm, normal S1 and S2 and no murmur heard [Coordination Grossly Intact] : coordination grossly intact [Normal Affect] : the affect was normal [Alert and Oriented x3] : oriented to person, place, and time [Normal Insight/Judgement] : insight and judgment were intact [de-identified] : appears fatigued

## 2021-04-01 NOTE — HISTORY OF PRESENT ILLNESS
[FreeTextEntry8] : c/o covid infection early feb, symptoms were cough, headache, and fever\par now w/ extreme fatigue, some SOB, doesn't feel well, thinks it's covid again\par denies fever\par stopped simvastatin, taking home remedy meds, afraid of liver damage\par [place] speaking

## 2021-04-15 ENCOUNTER — APPOINTMENT (OUTPATIENT)
Dept: INTERNAL MEDICINE | Facility: CLINIC | Age: 61
End: 2021-04-15

## 2021-05-12 ENCOUNTER — APPOINTMENT (OUTPATIENT)
Dept: FAMILY MEDICINE | Facility: CLINIC | Age: 61
End: 2021-05-12
Payer: MEDICAID

## 2021-05-12 VITALS
OXYGEN SATURATION: 98 % | TEMPERATURE: 97.5 F | SYSTOLIC BLOOD PRESSURE: 120 MMHG | HEART RATE: 68 BPM | BODY MASS INDEX: 24.33 KG/M2 | RESPIRATION RATE: 18 BRPM | DIASTOLIC BLOOD PRESSURE: 70 MMHG | WEIGHT: 133 LBS

## 2021-05-12 DIAGNOSIS — Z87.898 PERSONAL HISTORY OF OTHER SPECIFIED CONDITIONS: ICD-10-CM

## 2021-05-12 DIAGNOSIS — Z11.59 ENCOUNTER FOR SCREENING FOR OTHER VIRAL DISEASES: ICD-10-CM

## 2021-05-12 DIAGNOSIS — M54.6 PAIN IN THORACIC SPINE: ICD-10-CM

## 2021-05-12 DIAGNOSIS — M54.2 CERVICALGIA: ICD-10-CM

## 2021-05-12 PROCEDURE — 96372 THER/PROPH/DIAG INJ SC/IM: CPT

## 2021-05-12 PROCEDURE — 99213 OFFICE O/P EST LOW 20 MIN: CPT | Mod: 25

## 2021-05-12 PROCEDURE — 99072 ADDL SUPL MATRL&STAF TM PHE: CPT

## 2021-05-12 RX ORDER — METHYLPRED ACET/NACL,ISO-OS/PF 40 MG/ML
40 VIAL (ML) INJECTION
Qty: 1 | Refills: 0 | Status: COMPLETED | OUTPATIENT
Start: 2021-05-12

## 2021-05-12 RX ADMIN — METHYLPREDNISOLONE ACETATE 0 MG/ML: 40 INJECTION, SUSPENSION INTRA-ARTICULAR; INTRALESIONAL; INTRAMUSCULAR; SOFT TISSUE at 00:00

## 2021-05-12 NOTE — HISTORY OF PRESENT ILLNESS
[FreeTextEntry8] : c/o headache that started from rt side of the back of the neck that radiates up to rt side of head, gradually worsening after putting down dog last wk\par took tramadol/ibuprofen like med from [facility] x 2 w/ relief\par would like similar med for relief\par denies, numbness/tingling, one sided weakness, slurring, or any other neurological symptoms\par hx of headaches in the past\par now regularly taking her statin\par [place] speaking

## 2021-05-12 NOTE — PLAN
[FreeTextEntry1] : Allowed to vent, emotional support provided\par Neck stretching exercises advised\par etodolac w/ food\par f/u w/ neuro if no improvement

## 2021-05-13 ENCOUNTER — TRANSCRIPTION ENCOUNTER (OUTPATIENT)
Age: 61
End: 2021-05-13

## 2021-08-10 NOTE — H&P ADULT - REASON FOR ADMISSION
Physical Therapy     Referred by: Dedra Dodson MD; Medical Diagnosis (from order):    Diagnosis Information      Diagnosis    719.46 (ICD-9-CM) - M25.561 (ICD-10-CM) - Right knee pain                Daily Treatment Note    Visit:  6   Patient alert and oriented X3.    SUBJECTIVE                                                                                                             Difficulty sleeping, finding a comfortable position for prolonged periods. Non WTB x 6 weeks from surgery.    Pain / Symptoms:  Pain rating (out of 10): Current: 0 ; Best: 0; Worst: 4Location: Right knee medial   Quality / Description: ache, sore, stiff.  Alleviating Factors: rest.   Progression since onset: improved    OBJECTIVE                                                                                                                     Range of Motion (ROM)   (degrees unless noted; active unless noted; norms in ( ); negative=lacking to 0, positive=beyond 0)   Hip:      - Flexion (100-120):        • Right: Passive: 111      TREATMENT                                                                                                                  Therapeutic Exercise:  Portion of treatment provided by: SHELLEY Wilkerson under supervision of Abraham Toledo DPT    Knee flexion PROM 10min  Supine heel slides 1 x 20  (not past 90)  Seated PF orange 2 x 10   Straight leg raise 2x15 (2nd set with 3#)  Sidelying hip abd 1 x 10  Prone hip ext 1 x 15  sidelying add 1 x 15   Standing 3-way hip kicks R only 1 x 10 each  3# at ankle  Seated knee flexion hangs with self assist  1 x 10   Recumbent bike passive half circles 5 min    Fitted size D tubigrip     Skilled input: verbal instruction/cues    Writer verbally educated and received verbal consent for hand placement, positioning of patient, and techniques to be performed today from patient for clothing adjustments for techniques, therapist position for techniques and hand placement and  palpation for techniques as described above and how they are pertinent to the patient's plan of care.       ASSESSMENT                                                                                                             Patient displays improved knee flexion to 111deg pain free passively and decreased knee swelling.  He was able to increase weight on leg raises without issues and good form.  Pain/symptoms after session (out of 10): 0           Therapy procedure time and total treatment time can be found documented on the Time Entry flowsheet   Small bowel obstruction- partial

## 2021-09-11 ENCOUNTER — RX RENEWAL (OUTPATIENT)
Age: 61
End: 2021-09-11

## 2021-10-08 ENCOUNTER — APPOINTMENT (OUTPATIENT)
Dept: FAMILY MEDICINE | Facility: CLINIC | Age: 61
End: 2021-10-08
Payer: MEDICAID

## 2021-10-08 VITALS
WEIGHT: 133 LBS | DIASTOLIC BLOOD PRESSURE: 72 MMHG | OXYGEN SATURATION: 97 % | TEMPERATURE: 97.6 F | BODY MASS INDEX: 24.48 KG/M2 | HEIGHT: 62 IN | SYSTOLIC BLOOD PRESSURE: 115 MMHG | HEART RATE: 70 BPM

## 2021-10-08 DIAGNOSIS — M79.673 PAIN IN UNSPECIFIED FOOT: ICD-10-CM

## 2021-10-08 DIAGNOSIS — M25.579 PAIN IN UNSPECIFIED ANKLE AND JOINTS OF UNSPECIFIED FOOT: ICD-10-CM

## 2021-10-08 PROCEDURE — 99213 OFFICE O/P EST LOW 20 MIN: CPT

## 2021-10-08 NOTE — ASSESSMENT
[FreeTextEntry1] : left foot pain\par xray of foot/ankle/tib/fib\par mobic\par if no improvement, ortho

## 2021-10-08 NOTE — HISTORY OF PRESENT ILLNESS
[FreeTextEntry8] : 61 year old female here with complaints of left foot pain, getting worse, with or without pressure.  Patients active medications, allergies and issues were all reviewed with the patient at time of visit.\par

## 2021-10-08 NOTE — PHYSICAL EXAM
[Well Nourished] : well nourished [Well Developed] : well developed [Clear to Auscultation] : lungs were clear to auscultation bilaterally [Regular Rhythm] : with a regular rhythm [Normal S1, S2] : normal S1 and S2 [Normal Gait] : normal gait [Normal Affect] : the affect was normal [de-identified] : tenderness to left anterior tarsal of foot, mild swelling

## 2021-11-11 NOTE — ED ADULT NURSE NOTE - NSSUSCREENINGQ3_ED_ALL_ED
Medications have been prescribed to you today please pick them up at your pharmacy they have been sent electronically.  If your pharmacy advises you that any of these prescriptions require prior authorization please allow us approximately 10 business days to complete the prior authorization process and hear back from your insurance company.    If you require refills on medications prior to your next scheduled appointment please allow 48 to 72 hours for completion of this request and please login to the live well application through which we can communicate to you that the medications have been sent to the pharmacy.    If we have ordered diagnostic studies such as CT scans, MRIs, ultrasounds, mammograms, they often require prior authorization by your insurance company.  In order to facilitate us obtaining this authorization for you it is imperative for you to schedule the appointment and notify us of the time and date of the appointment so that we can complete the authorization process for you.  Should you fail to notify us of the time and date of your appointment cannot guarantee that this procedure will be properly authorized by your insurance company.     No

## 2021-12-15 ENCOUNTER — RX RENEWAL (OUTPATIENT)
Age: 61
End: 2021-12-15

## 2022-01-14 ENCOUNTER — RX RENEWAL (OUTPATIENT)
Age: 62
End: 2022-01-14

## 2022-03-30 ENCOUNTER — RX RENEWAL (OUTPATIENT)
Age: 62
End: 2022-03-30

## 2022-12-22 ENCOUNTER — RX RENEWAL (OUTPATIENT)
Age: 62
End: 2022-12-22

## 2023-01-11 ENCOUNTER — APPOINTMENT (OUTPATIENT)
Dept: FAMILY MEDICINE | Facility: CLINIC | Age: 63
End: 2023-01-11

## 2023-01-20 ENCOUNTER — APPOINTMENT (OUTPATIENT)
Dept: FAMILY MEDICINE | Facility: CLINIC | Age: 63
End: 2023-01-20

## 2023-04-25 ENCOUNTER — APPOINTMENT (OUTPATIENT)
Dept: FAMILY MEDICINE | Facility: CLINIC | Age: 63
End: 2023-04-25
Payer: MEDICAID

## 2023-04-25 VITALS
HEIGHT: 62 IN | SYSTOLIC BLOOD PRESSURE: 122 MMHG | TEMPERATURE: 98.6 F | WEIGHT: 120.56 LBS | HEART RATE: 71 BPM | BODY MASS INDEX: 22.19 KG/M2 | OXYGEN SATURATION: 99 % | DIASTOLIC BLOOD PRESSURE: 71 MMHG

## 2023-04-25 DIAGNOSIS — Z83.79 FAMILY HISTORY OF OTHER DISEASES OF THE DIGESTIVE SYSTEM: ICD-10-CM

## 2023-04-25 DIAGNOSIS — Z83.3 FAMILY HISTORY OF DIABETES MELLITUS: ICD-10-CM

## 2023-04-25 DIAGNOSIS — M81.0 AGE-RELATED OSTEOPOROSIS W/OUT CURRENT PATHOLOGICAL FRACTURE: ICD-10-CM

## 2023-04-25 DIAGNOSIS — R73.03 PREDIABETES.: ICD-10-CM

## 2023-04-25 DIAGNOSIS — U07.1 COVID-19: ICD-10-CM

## 2023-04-25 PROCEDURE — 99396 PREV VISIT EST AGE 40-64: CPT | Mod: 25

## 2023-04-25 PROCEDURE — 36415 COLL VENOUS BLD VENIPUNCTURE: CPT

## 2023-04-25 PROCEDURE — 93000 ELECTROCARDIOGRAM COMPLETE: CPT

## 2023-04-25 RX ORDER — CINNAMON BARK 500 MG
CAPSULE ORAL
Refills: 0 | Status: ACTIVE | COMMUNITY

## 2023-04-25 RX ORDER — MELOXICAM 15 MG/1
15 TABLET ORAL
Qty: 15 | Refills: 0 | Status: DISCONTINUED | COMMUNITY
Start: 2021-10-08 | End: 2023-04-25

## 2023-04-25 RX ORDER — OLOPATADINE HCL 1 MG/ML
0.1 SOLUTION/ DROPS OPHTHALMIC TWICE DAILY
Qty: 1 | Refills: 2 | Status: DISCONTINUED | COMMUNITY
Start: 2018-05-17 | End: 2023-04-25

## 2023-04-25 RX ORDER — ETODOLAC 400 MG/1
400 TABLET, FILM COATED ORAL TWICE DAILY
Qty: 40 | Refills: 0 | Status: DISCONTINUED | COMMUNITY
Start: 2018-05-16 | End: 2023-04-25

## 2023-04-25 RX ORDER — OMEGA-3/DHA/EPA/FISH OIL 300-1000MG
CAPSULE ORAL
Refills: 0 | Status: ACTIVE | COMMUNITY

## 2023-04-25 RX ORDER — ASCORBIC ACID 500 MG
TABLET ORAL
Refills: 0 | Status: ACTIVE | COMMUNITY

## 2023-04-25 RX ORDER — IBUPROFEN 600 MG/1
600 TABLET, FILM COATED ORAL 3 TIMES DAILY
Qty: 15 | Refills: 0 | Status: DISCONTINUED | COMMUNITY
Start: 2018-02-20 | End: 2023-04-25

## 2023-04-25 RX ORDER — FLUTICASONE PROPIONATE 50 UG/1
50 SPRAY, METERED NASAL TWICE DAILY
Qty: 1 | Refills: 1 | Status: DISCONTINUED | COMMUNITY
Start: 2018-05-16 | End: 2023-04-25

## 2023-04-26 LAB
25(OH)D3 SERPL-MCNC: 33.2 NG/ML
ALBUMIN SERPL ELPH-MCNC: 4.9 G/DL
ALP BLD-CCNC: 90 U/L
ALT SERPL-CCNC: 12 U/L
ANION GAP SERPL CALC-SCNC: 13 MMOL/L
APPEARANCE: CLEAR
AST SERPL-CCNC: 17 U/L
BASOPHILS # BLD AUTO: 0.03 K/UL
BASOPHILS NFR BLD AUTO: 0.8 %
BILIRUB SERPL-MCNC: 0.7 MG/DL
BILIRUBIN URINE: NEGATIVE
BLOOD URINE: NEGATIVE
BUN SERPL-MCNC: 11 MG/DL
CALCIUM SERPL-MCNC: 10.4 MG/DL
CHLORIDE SERPL-SCNC: 105 MMOL/L
CHOLEST SERPL-MCNC: 281 MG/DL
CK SERPL-CCNC: 53 U/L
CO2 SERPL-SCNC: 25 MMOL/L
COLOR: YELLOW
CREAT SERPL-MCNC: 0.66 MG/DL
CRP SERPL-MCNC: <3 MG/L
EBV EA AB SER IA-ACNC: <5 U/ML
EBV EA AB TITR SER IF: POSITIVE
EBV EA IGG SER QL IA: >600 U/ML
EBV EA IGG SER-ACNC: NEGATIVE
EBV EA IGM SER IA-ACNC: NEGATIVE
EBV PATRN SPEC IB-IMP: NORMAL
EBV VCA IGG SER IA-ACNC: >750 U/ML
EBV VCA IGM SER QL IA: <10 U/ML
EGFR: 98 ML/MIN/1.73M2
EOSINOPHIL # BLD AUTO: 0.04 K/UL
EOSINOPHIL NFR BLD AUTO: 1.1 %
EPSTEIN-BARR VIRUS CAPSID ANTIGEN IGG: POSITIVE
ERYTHROCYTE [SEDIMENTATION RATE] IN BLOOD BY WESTERGREN METHOD: 5 MM/HR
ESTIMATED AVERAGE GLUCOSE: 126 MG/DL
FERRITIN SERPL-MCNC: 104 NG/ML
FOLATE SERPL-MCNC: >20 NG/ML
GLUCOSE QUALITATIVE U: NEGATIVE MG/DL
GLUCOSE SERPL-MCNC: 98 MG/DL
HBA1C MFR BLD HPLC: 6 %
HCT VFR BLD CALC: 43.6 %
HDLC SERPL-MCNC: 68 MG/DL
HGB BLD-MCNC: 13.9 G/DL
IMM GRANULOCYTES NFR BLD AUTO: 0 %
IRON SATN MFR SERPL: 36 %
IRON SERPL-MCNC: 114 UG/DL
KETONES URINE: NEGATIVE MG/DL
LDLC SERPL CALC-MCNC: 186 MG/DL
LEUKOCYTE ESTERASE URINE: NEGATIVE
LYMPHOCYTES # BLD AUTO: 1.59 K/UL
LYMPHOCYTES NFR BLD AUTO: 44.2 %
MAN DIFF?: NORMAL
MCHC RBC-ENTMCNC: 29.5 PG
MCHC RBC-ENTMCNC: 31.9 GM/DL
MCV RBC AUTO: 92.6 FL
MONOCYTES # BLD AUTO: 0.25 K/UL
MONOCYTES NFR BLD AUTO: 6.9 %
NEUTROPHILS # BLD AUTO: 1.69 K/UL
NEUTROPHILS NFR BLD AUTO: 47 %
NITRITE URINE: NEGATIVE
NONHDLC SERPL-MCNC: 213 MG/DL
PH URINE: 5.5
PLATELET # BLD AUTO: 214 K/UL
POTASSIUM SERPL-SCNC: 5.2 MMOL/L
PROT SERPL-MCNC: 7.8 G/DL
PROTEIN URINE: NEGATIVE MG/DL
RBC # BLD: 4.71 M/UL
RBC # FLD: 13.3 %
RHEUMATOID FACT SER QL: <10 IU/ML
SODIUM SERPL-SCNC: 143 MMOL/L
SPECIFIC GRAVITY URINE: 1.03
TIBC SERPL-MCNC: 318 UG/DL
TRIGL SERPL-MCNC: 136 MG/DL
TSH SERPL-ACNC: 0.96 UIU/ML
UIBC SERPL-MCNC: 203 UG/DL
URATE SERPL-MCNC: 4 MG/DL
UROBILINOGEN URINE: 0.2 MG/DL
VIT B12 SERPL-MCNC: 745 PG/ML
WBC # FLD AUTO: 3.6 K/UL

## 2023-04-26 RX ORDER — ROSUVASTATIN CALCIUM 10 MG/1
10 TABLET, FILM COATED ORAL
Qty: 90 | Refills: 1 | Status: ACTIVE | COMMUNITY
Start: 2023-04-26 | End: 1900-01-01

## 2023-04-26 NOTE — HISTORY OF PRESENT ILLNESS
[FreeTextEntry1] : 62 year old female who presents today for a complete physical exam.\par c/o abdominal discomfort and fatigue\par  [de-identified] : cinnamon, vit c, mvi, mg+\par completed mammo and pap last year in [facility], per pt nml

## 2023-04-26 NOTE — HEALTH RISK ASSESSMENT
[Good] : ~his/her~  mood as  good [No] : No [Never] : Never [de-identified] : exercises [de-identified] : healthy

## 2023-04-27 DIAGNOSIS — R53.83 OTHER FATIGUE: ICD-10-CM

## 2023-04-27 DIAGNOSIS — R76.8 OTHER SPECIFIED ABNORMAL IMMUNOLOGICAL FINDINGS IN SERUM: ICD-10-CM

## 2023-04-27 LAB
ANA PAT FLD IF-IMP: ABNORMAL
ANA SER IF-ACNC: ABNORMAL

## 2023-05-01 ENCOUNTER — APPOINTMENT (OUTPATIENT)
Dept: ORTHOPEDIC SURGERY | Facility: CLINIC | Age: 63
End: 2023-05-01
Payer: MEDICAID

## 2023-05-01 VITALS
DIASTOLIC BLOOD PRESSURE: 81 MMHG | HEART RATE: 89 BPM | OXYGEN SATURATION: 98 % | WEIGHT: 120 LBS | BODY MASS INDEX: 19.29 KG/M2 | HEIGHT: 66 IN | SYSTOLIC BLOOD PRESSURE: 131 MMHG

## 2023-05-01 PROCEDURE — 99203 OFFICE O/P NEW LOW 30 MIN: CPT

## 2023-05-01 PROCEDURE — 73564 X-RAY EXAM KNEE 4 OR MORE: CPT | Mod: LT

## 2023-05-01 RX ORDER — MELOXICAM 15 MG/1
15 TABLET ORAL
Qty: 30 | Refills: 2 | Status: ACTIVE | COMMUNITY
Start: 2023-05-01 | End: 1900-01-01

## 2023-05-04 ENCOUNTER — APPOINTMENT (OUTPATIENT)
Dept: RADIOLOGY | Facility: CLINIC | Age: 63
End: 2023-05-04
Payer: MEDICAID

## 2023-05-04 ENCOUNTER — OUTPATIENT (OUTPATIENT)
Dept: OUTPATIENT SERVICES | Facility: HOSPITAL | Age: 63
LOS: 1 days | End: 2023-05-04

## 2023-05-04 DIAGNOSIS — Z98.891 HISTORY OF UTERINE SCAR FROM PREVIOUS SURGERY: Chronic | ICD-10-CM

## 2023-05-04 PROCEDURE — 77080 DXA BONE DENSITY AXIAL: CPT | Mod: 26

## 2023-05-07 RX ORDER — CALCIUM CARBONATE/VITAMIN D3 600 MG-20
600-20 TABLET ORAL
Qty: 180 | Refills: 3 | Status: ACTIVE | COMMUNITY
Start: 2023-05-07 | End: 1900-01-01

## 2023-09-13 ENCOUNTER — APPOINTMENT (OUTPATIENT)
Dept: INTERNAL MEDICINE | Facility: CLINIC | Age: 63
End: 2023-09-13

## 2023-09-26 ENCOUNTER — APPOINTMENT (OUTPATIENT)
Dept: FAMILY MEDICINE | Facility: CLINIC | Age: 63
End: 2023-09-26
Payer: MEDICARE

## 2023-09-26 VITALS
WEIGHT: 117.38 LBS | OXYGEN SATURATION: 97 % | HEART RATE: 76 BPM | DIASTOLIC BLOOD PRESSURE: 79 MMHG | TEMPERATURE: 98.9 F | SYSTOLIC BLOOD PRESSURE: 123 MMHG | BODY MASS INDEX: 18.86 KG/M2 | HEIGHT: 66 IN

## 2023-09-26 PROCEDURE — 99214 OFFICE O/P EST MOD 30 MIN: CPT | Mod: 25

## 2023-09-26 RX ORDER — HYDROXYZINE HYDROCHLORIDE 10 MG/1
10 TABLET ORAL
Qty: 14 | Refills: 0 | Status: ACTIVE | COMMUNITY
Start: 2023-09-26 | End: 1900-01-01

## 2023-09-27 ENCOUNTER — APPOINTMENT (OUTPATIENT)
Dept: RHEUMATOLOGY | Facility: CLINIC | Age: 63
End: 2023-09-27

## 2023-09-29 ENCOUNTER — RX RENEWAL (OUTPATIENT)
Age: 63
End: 2023-09-29

## 2023-11-08 ENCOUNTER — APPOINTMENT (OUTPATIENT)
Dept: FAMILY MEDICINE | Facility: CLINIC | Age: 63
End: 2023-11-08
Payer: MEDICARE

## 2023-11-08 PROCEDURE — 99213 OFFICE O/P EST LOW 20 MIN: CPT | Mod: 95

## 2023-11-08 RX ORDER — ESCITALOPRAM OXALATE 10 MG/1
10 TABLET ORAL
Qty: 60 | Refills: 2 | Status: ACTIVE | COMMUNITY
Start: 2023-09-26 | End: 1900-01-01

## 2024-01-11 NOTE — PHYSICAL EXAM
[No Acute Distress] : no acute distress Regular, consistent carbohydrate/solids [Well Nourished] : well nourished [Normal Sclera/Conjunctiva] : normal sclera/conjunctiva [PERRL] : pupils equal round and reactive to light [EOMI] : extraocular movements intact [Normal Outer Ear/Nose] : the outer ears and nose were normal in appearance [No JVD] : no jugular venous distention [No Accessory Muscle Use] : no accessory muscle use [Coordination Grossly Intact] : coordination grossly intact [No Focal Deficits] : no focal deficits [Normal Gait] : normal gait [Normal Affect] : the affect was normal [Alert and Oriented x3] : oriented to person, place, and time [Normal Insight/Judgement] : insight and judgment were intact [de-identified] : firm b/l trapezius rt>left

## 2024-03-29 ENCOUNTER — APPOINTMENT (OUTPATIENT)
Dept: FAMILY MEDICINE | Facility: CLINIC | Age: 64
End: 2024-03-29
Payer: MEDICARE

## 2024-03-29 VITALS
BODY MASS INDEX: 18.48 KG/M2 | HEIGHT: 66 IN | TEMPERATURE: 96.3 F | WEIGHT: 115 LBS | RESPIRATION RATE: 17 BRPM | DIASTOLIC BLOOD PRESSURE: 73 MMHG | SYSTOLIC BLOOD PRESSURE: 133 MMHG | OXYGEN SATURATION: 100 % | HEART RATE: 64 BPM

## 2024-03-29 DIAGNOSIS — K29.70 GASTRITIS, UNSPECIFIED, W/OUT BLEEDING: ICD-10-CM

## 2024-03-29 DIAGNOSIS — K29.50 UNSPECIFIED CHRONIC GASTRITIS W/OUT BLEEDING: ICD-10-CM

## 2024-03-29 DIAGNOSIS — Z12.11 ENCOUNTER FOR SCREENING FOR MALIGNANT NEOPLASM OF COLON: ICD-10-CM

## 2024-03-29 PROCEDURE — 99214 OFFICE O/P EST MOD 30 MIN: CPT

## 2024-03-29 RX ORDER — OMEPRAZOLE 40 MG/1
40 CAPSULE, DELAYED RELEASE ORAL
Qty: 30 | Refills: 0 | Status: ACTIVE | COMMUNITY
Start: 2024-03-29 | End: 1900-01-01

## 2024-03-31 ENCOUNTER — RX RENEWAL (OUTPATIENT)
Age: 64
End: 2024-03-31

## 2024-03-31 PROBLEM — K29.70 GASTRITIS: Status: ACTIVE | Noted: 2024-03-29

## 2024-03-31 NOTE — HISTORY OF PRESENT ILLNESS
[FreeTextEntry1] : follow up with pain on right arm  [de-identified] : Ms. HARSHIL BUENO is a 62 yearold female presents today for multiple issues  c/o right upper arm pain starts from shoulder and sometimes radiates to hand. This is a chronic problem. Denies injury/trauma. Using topical pain creams with some relief.   C/o belching, bloating with nausea and heartburn. Denies vomiting, diarrhea, constipation.

## 2024-03-31 NOTE — ASSESSMENT
[FreeTextEntry1] : # gastritis  r/o H pylori  f/u labs  Start PPI GI referral  # shoulder/arm pain right  likely musculoskeletal.  Tylenol or Motrin PRN for pain

## 2024-03-31 NOTE — PHYSICAL EXAM
[No Acute Distress] : no acute distress [Well-Appearing] : well-appearing [No Respiratory Distress] : no respiratory distress  [No Accessory Muscle Use] : no accessory muscle use [Clear to Auscultation] : lungs were clear to auscultation bilaterally [Normal S1, S2] : normal S1 and S2 [Normal Rate] : normal rate  [Regular Rhythm] : with a regular rhythm [Non-distended] : non-distended [Soft] : abdomen soft [No Murmur] : no murmur heard [No Masses] : no abdominal mass palpated [No HSM] : no HSM [Epigastric] : in the epigastric area [de-identified] :  Full ROM. + crepitus and tenderness right shoulder

## 2024-04-02 LAB
ALBUMIN SERPL ELPH-MCNC: 4.8 G/DL
ALP BLD-CCNC: 91 U/L
ALT SERPL-CCNC: 16 U/L
ANION GAP SERPL CALC-SCNC: 12 MMOL/L
AST SERPL-CCNC: 18 U/L
BASOPHILS # BLD AUTO: 0.05 K/UL
BASOPHILS NFR BLD AUTO: 1.4 %
BILIRUB SERPL-MCNC: 0.9 MG/DL
BUN SERPL-MCNC: 9 MG/DL
CALCIUM SERPL-MCNC: 9.7 MG/DL
CHLORIDE SERPL-SCNC: 106 MMOL/L
CHOLEST SERPL-MCNC: 206 MG/DL
CO2 SERPL-SCNC: 24 MMOL/L
CREAT SERPL-MCNC: 0.66 MG/DL
EGFR: 97 ML/MIN/1.73M2
EOSINOPHIL # BLD AUTO: 0.06 K/UL
EOSINOPHIL NFR BLD AUTO: 1.7 %
ESTIMATED AVERAGE GLUCOSE: 120 MG/DL
GLUCOSE SERPL-MCNC: 103 MG/DL
HBA1C MFR BLD HPLC: 5.8 %
HCT VFR BLD CALC: 42.3 %
HDLC SERPL-MCNC: 77 MG/DL
HGB BLD-MCNC: 13.6 G/DL
IMM GRANULOCYTES NFR BLD AUTO: 0 %
LDLC SERPL CALC-MCNC: 110 MG/DL
LYMPHOCYTES # BLD AUTO: 1.71 K/UL
LYMPHOCYTES NFR BLD AUTO: 47.4 %
MAN DIFF?: NORMAL
MCHC RBC-ENTMCNC: 29.4 PG
MCHC RBC-ENTMCNC: 32.2 GM/DL
MCV RBC AUTO: 91.6 FL
MONOCYTES # BLD AUTO: 0.32 K/UL
MONOCYTES NFR BLD AUTO: 8.9 %
NEUTROPHILS # BLD AUTO: 1.47 K/UL
NEUTROPHILS NFR BLD AUTO: 40.6 %
NONHDLC SERPL-MCNC: 129 MG/DL
PLATELET # BLD AUTO: 201 K/UL
POTASSIUM SERPL-SCNC: 4.7 MMOL/L
PROT SERPL-MCNC: 7.4 G/DL
RBC # BLD: 4.62 M/UL
RBC # FLD: 13.6 %
SODIUM SERPL-SCNC: 142 MMOL/L
TRIGL SERPL-MCNC: 106 MG/DL
TSH SERPL-ACNC: 1.06 UIU/ML
WBC # FLD AUTO: 3.61 K/UL

## 2024-04-11 ENCOUNTER — APPOINTMENT (OUTPATIENT)
Dept: RADIOLOGY | Facility: CLINIC | Age: 64
End: 2024-04-11

## 2024-04-18 ENCOUNTER — APPOINTMENT (OUTPATIENT)
Dept: RADIOLOGY | Facility: CLINIC | Age: 64
End: 2024-04-18
Payer: MEDICARE

## 2024-04-18 ENCOUNTER — OUTPATIENT (OUTPATIENT)
Dept: OUTPATIENT SERVICES | Facility: HOSPITAL | Age: 64
LOS: 1 days | End: 2024-04-18

## 2024-04-18 DIAGNOSIS — Z98.891 HISTORY OF UTERINE SCAR FROM PREVIOUS SURGERY: Chronic | ICD-10-CM

## 2024-04-18 PROCEDURE — 73030 X-RAY EXAM OF SHOULDER: CPT | Mod: 26,RT

## 2024-05-15 ENCOUNTER — APPOINTMENT (OUTPATIENT)
Dept: GASTROENTEROLOGY | Facility: CLINIC | Age: 64
End: 2024-05-15
Payer: MEDICARE

## 2024-05-15 VITALS
BODY MASS INDEX: 18.96 KG/M2 | HEART RATE: 76 BPM | TEMPERATURE: 97.7 F | DIASTOLIC BLOOD PRESSURE: 74 MMHG | OXYGEN SATURATION: 98 % | SYSTOLIC BLOOD PRESSURE: 121 MMHG | RESPIRATION RATE: 17 BRPM | HEIGHT: 66 IN | WEIGHT: 118 LBS

## 2024-05-15 DIAGNOSIS — R11.0 NAUSEA: ICD-10-CM

## 2024-05-15 DIAGNOSIS — Z86.39 PERSONAL HISTORY OF OTHER ENDOCRINE, NUTRITIONAL AND METABOLIC DISEASE: ICD-10-CM

## 2024-05-15 PROCEDURE — 99203 OFFICE O/P NEW LOW 30 MIN: CPT

## 2024-05-15 RX ORDER — SODIUM SULFATE, POTASSIUM SULFATE AND MAGNESIUM SULFATE 1.6; 3.13; 17.5 G/177ML; G/177ML; G/177ML
17.5-3.13-1.6 SOLUTION ORAL
Qty: 2 | Refills: 0 | Status: ACTIVE | COMMUNITY
Start: 2024-05-15 | End: 1900-01-01

## 2024-05-15 NOTE — HISTORY OF PRESENT ILLNESS
[FreeTextEntry1] : HARSHIL BUENO 63 year HF came for initial visit c/o abdominal bloating,gas and fullness,and nausea unrelated to  meals for the past few years. Denied any VCD or weight loss.  She had upper endoscopy and colonoscopy in her country [facility] more than 10 years ago She was treated for H.Pyl gastritis .No hx of excess use of NSAIDS or ETOH.No hx of substance abuse or OTC health food supplements.No FMH of IBD or Celiac Disease. She also presents for initial evaluation and consultation for Colorectal cancer screening.Denies any constipation,diarrhea, BPR,melena or unintentional weight loss.Reports having good appetite. No history of colorectal or any GI cancer in the  close family members. No history of any cardiac or pulmonary disease.

## 2024-05-15 NOTE — PHYSICAL EXAM
[Alert] : alert [Normal Voice/Communication] : normal voice/communication [Healthy Appearing] : healthy appearing [No Acute Distress] : no acute distress [Sclera] : the sclera and conjunctiva were normal [Hearing Threshold Finger Rub Not [place]] : hearing was normal [Normal Lips/Gums] : the lips and gums were normal [Oropharynx] : the oropharynx was normal [Normal Appearance] : the appearance of the neck was normal [No Neck Mass] : no neck mass was observed [No Respiratory Distress] : no respiratory distress [No Acc Muscle Use] : no accessory muscle use [Respiration, Rhythm And Depth] : normal respiratory rhythm and effort [Auscultation Breath Sounds / Voice Sounds] : lungs were clear to auscultation bilaterally [Heart Rate And Rhythm] : heart rate was normal and rhythm regular [Normal S1, S2] : normal S1 and S2 [Murmurs] : no murmurs [None] : no edema [Bowel Sounds] : normal bowel sounds [Abdomen Tenderness] : non-tender Statement Selected [No Masses] : no abdominal mass palpated [Abdomen Soft] : soft [Cervical Lymph Nodes Enlarged Posterior Bilaterally] : no posterior cervical lymphadenopathy [Supraclavicular Lymph Nodes Enlarged Bilaterally] : no supraclavicular lymphadenopathy [Cervical Lymph Nodes Enlarged Anterior Bilaterally] : no anterior cervical lymphadenopathy [No Spinal Tenderness] : no spinal tenderness [Abnormal Walk] : normal gait [No Clubbing, Cyanosis] : no clubbing or cyanosis of the fingernails [Normal Color / Pigmentation] : normal skin color and pigmentation [] : no rash [No Focal Deficits] : no focal deficits [Oriented To Time, Place, And Person] : oriented to person, place, and time

## 2024-05-15 NOTE — ASSESSMENT
[FreeTextEntry1] : 63-year-old  woman from [facility] with abdominal bloating and for interval colonoscopy Bowel prep and RBA explained I recommended upper endoscopy with EUS and colonoscopy

## 2024-06-13 ENCOUNTER — NON-APPOINTMENT (OUTPATIENT)
Age: 64
End: 2024-06-13

## 2024-06-13 ENCOUNTER — APPOINTMENT (OUTPATIENT)
Dept: FAMILY MEDICINE | Facility: CLINIC | Age: 64
End: 2024-06-13
Payer: MEDICARE

## 2024-06-13 VITALS
TEMPERATURE: 98.6 F | OXYGEN SATURATION: 98 % | HEART RATE: 65 BPM | SYSTOLIC BLOOD PRESSURE: 118 MMHG | BODY MASS INDEX: 17.84 KG/M2 | HEIGHT: 66 IN | RESPIRATION RATE: 17 BRPM | DIASTOLIC BLOOD PRESSURE: 77 MMHG | WEIGHT: 111 LBS

## 2024-06-13 DIAGNOSIS — M79.621 PAIN IN RIGHT UPPER ARM: ICD-10-CM

## 2024-06-13 DIAGNOSIS — R10.9 UNSPECIFIED ABDOMINAL PAIN: ICD-10-CM

## 2024-06-13 DIAGNOSIS — M25.562 PAIN IN RIGHT KNEE: ICD-10-CM

## 2024-06-13 DIAGNOSIS — Z83.3 FAMILY HISTORY OF DIABETES MELLITUS: ICD-10-CM

## 2024-06-13 DIAGNOSIS — R14.0 ABDOMINAL DISTENSION (GASEOUS): ICD-10-CM

## 2024-06-13 DIAGNOSIS — F41.8 OTHER SPECIFIED ANXIETY DISORDERS: ICD-10-CM

## 2024-06-13 DIAGNOSIS — J30.9 ALLERGIC RHINITIS, UNSPECIFIED: ICD-10-CM

## 2024-06-13 DIAGNOSIS — Z83.79 FAMILY HISTORY OF OTHER DISEASES OF THE DIGESTIVE SYSTEM: ICD-10-CM

## 2024-06-13 DIAGNOSIS — M25.561 PAIN IN RIGHT KNEE: ICD-10-CM

## 2024-06-13 DIAGNOSIS — E78.5 HYPERLIPIDEMIA, UNSPECIFIED: ICD-10-CM

## 2024-06-13 DIAGNOSIS — M17.0 BILATERAL PRIMARY OSTEOARTHRITIS OF KNEE: ICD-10-CM

## 2024-06-13 DIAGNOSIS — Z87.898 PERSONAL HISTORY OF OTHER SPECIFIED CONDITIONS: ICD-10-CM

## 2024-06-13 DIAGNOSIS — N60.09 SOLITARY CYST OF UNSPECIFIED BREAST: ICD-10-CM

## 2024-06-13 DIAGNOSIS — Z00.00 ENCOUNTER FOR GENERAL ADULT MEDICAL EXAMINATION W/OUT ABNORMAL FINDINGS: ICD-10-CM

## 2024-06-13 DIAGNOSIS — Z80.3 FAMILY HISTORY OF MALIGNANT NEOPLASM OF BREAST: ICD-10-CM

## 2024-06-13 DIAGNOSIS — M79.629 PAIN IN UNSPECIFIED UPPER ARM: ICD-10-CM

## 2024-06-13 DIAGNOSIS — R11.0 NAUSEA: ICD-10-CM

## 2024-06-13 DIAGNOSIS — N64.4 MASTODYNIA: ICD-10-CM

## 2024-06-13 DIAGNOSIS — E55.9 VITAMIN D DEFICIENCY, UNSPECIFIED: ICD-10-CM

## 2024-06-13 DIAGNOSIS — M54.2 CERVICALGIA: ICD-10-CM

## 2024-06-13 PROCEDURE — 36415 COLL VENOUS BLD VENIPUNCTURE: CPT

## 2024-06-13 PROCEDURE — 99397 PER PM REEVAL EST PAT 65+ YR: CPT

## 2024-06-13 PROCEDURE — 99214 OFFICE O/P EST MOD 30 MIN: CPT | Mod: 25

## 2024-06-13 PROCEDURE — 93000 ELECTROCARDIOGRAM COMPLETE: CPT

## 2024-06-13 RX ORDER — MONTELUKAST 10 MG/1
10 TABLET, FILM COATED ORAL
Qty: 1 | Refills: 2 | Status: ACTIVE | COMMUNITY
Start: 2024-06-13 | End: 1900-01-01

## 2024-06-13 RX ORDER — LORATADINE 10 MG/1
10 TABLET ORAL
Qty: 30 | Refills: 3 | Status: ACTIVE | COMMUNITY
Start: 2024-06-13 | End: 1900-01-01

## 2024-06-13 RX ORDER — FLUTICASONE PROPIONATE 50 UG/1
50 SPRAY, METERED NASAL TWICE DAILY
Qty: 1 | Refills: 2 | Status: ACTIVE | COMMUNITY
Start: 2024-06-13 | End: 1900-01-01

## 2024-06-13 NOTE — PHYSICAL EXAM
[No Acute Distress] : no acute distress [Well Nourished] : well nourished [Well Developed] : well developed [Well-Appearing] : well-appearing [Normal Sclera/Conjunctiva] : normal sclera/conjunctiva [PERRL] : pupils equal round and reactive to light [EOMI] : extraocular movements intact [Normal Outer Ear/Nose] : the outer ears and nose were normal in appearance [Normal Oropharynx] : the oropharynx was normal [No JVD] : no jugular venous distention [No Lymphadenopathy] : no lymphadenopathy [Supple] : supple [Thyroid Normal, No Nodules] : the thyroid was normal and there were no nodules present [No Respiratory Distress] : no respiratory distress  [No Accessory Muscle Use] : no accessory muscle use [Clear to Auscultation] : lungs were clear to auscultation bilaterally [Normal Rate] : normal rate  [Regular Rhythm] : with a regular rhythm [Normal S1, S2] : normal S1 and S2 [No Murmur] : no murmur heard [No Abdominal Bruit] : a ~M bruit was not heard ~T in the abdomen [No Varicosities] : no varicosities [Pedal Pulses Present] : the pedal pulses are present [No Edema] : there was no peripheral edema [No Palpable Aorta] : no palpable aorta [No Extremity Clubbing/Cyanosis] : no extremity clubbing/cyanosis [Normal Appearance] : normal in appearance [No Nipple Discharge] : no nipple discharge [No Axillary Lymphadenopathy] : no axillary lymphadenopathy [Soft] : abdomen soft [Non Tender] : non-tender [Non-distended] : non-distended [No Masses] : no abdominal mass palpated [No HSM] : no HSM [Normal Bowel Sounds] : normal bowel sounds [Normal Supraclavicular Nodes] : no supraclavicular lymphadenopathy [Normal Axillary Nodes] : no axillary lymphadenopathy [Normal Posterior Cervical Nodes] : no posterior cervical lymphadenopathy [Normal Anterior Cervical Nodes] : no anterior cervical lymphadenopathy [No CVA Tenderness] : no CVA  tenderness [No Spinal Tenderness] : no spinal tenderness [No Joint Swelling] : no joint swelling [Grossly Normal Strength/Tone] : grossly normal strength/tone [No Rash] : no rash [Coordination Grossly Intact] : coordination grossly intact [No Focal Deficits] : no focal deficits [Normal Gait] : normal gait [Normal Affect] : the affect was normal [Alert and Oriented x3] : oriented to person, place, and time [Normal Insight/Judgement] : insight and judgment were intact [Normal TMs] : both tympanic membranes were normal [de-identified] : injected nasal turbinates

## 2024-06-13 NOTE — HEALTH RISK ASSESSMENT
[Good] : ~his/her~  mood as  good [No] : In the past 12 months have you used drugs other than those required for medical reasons? No [Never] : Never [NO] : No [With Family] : lives with family [# of Members in Household ___] :  household currently consist of [unfilled] member(s) [Employed] : employed [Fully functional (bathing, dressing, toileting, transferring, walking, feeding)] : Fully functional (bathing, dressing, toileting, transferring, walking, feeding) [Fully functional (using the telephone, shopping, preparing meals, housekeeping, doing laundry, using] : Fully functional and needs no help or supervision to perform IADLs (using the telephone, shopping, preparing meals, housekeeping, doing laundry, using transportation, managing medications and managing finances) [de-identified] : walking [Change in mental status noted] : No change in mental status noted [Language] : denies difficulty with language [Handling Complex Tasks] : denies difficulty handling complex tasks [Reports changes in hearing] : Reports no changes in hearing [Reports changes in vision] : Reports no changes in vision [Reports changes in dental health] : Reports no changes in dental health [de-identified] : brother and his family [FreeTextEntry2] : house cleaning

## 2024-06-13 NOTE — PLAN
[FreeTextEntry1] : loratadine/flonase/montelukast f/u w/ GI derm/mammo/breast sono/gyn abd sono repeat bw

## 2024-06-13 NOTE — HISTORY OF PRESENT ILLNESS
[FreeTextEntry1] : 63 year old female who presents today for a complete physical exam. has +2 cats and 1 dog c/o sinus congestion, mucous, cough for a few months; hx of allergies, taking loratadine on and off doing well on lexapro, deferring any therapy f/u c/o b/l knee pain, would like to start PT c/o persistent breast pain, hx of breast cysts, hesitant about going for mammo, would like breast sono referral c/o chronic abdominal bloating and discomfort, following w/ GI, scheduled for egd/colonoscopy on 7/9 also worried about liver, as mother and sister have been diagnosed w/ liver ca and another sister w/ cirrhosis states taking atorvastatin 40 mg she bought in [facility]

## 2024-06-14 LAB
25(OH)D3 SERPL-MCNC: 66.4 NG/ML
ALBUMIN SERPL ELPH-MCNC: 4.5 G/DL
ALP BLD-CCNC: 97 U/L
ALT SERPL-CCNC: 21 U/L
ANION GAP SERPL CALC-SCNC: 12 MMOL/L
APPEARANCE: CLEAR
AST SERPL-CCNC: 29 U/L
BASOPHILS # BLD AUTO: 0.02 K/UL
BASOPHILS NFR BLD AUTO: 0.7 %
BILIRUB SERPL-MCNC: 0.6 MG/DL
BILIRUBIN URINE: NEGATIVE
BLOOD URINE: NEGATIVE
BUN SERPL-MCNC: 11 MG/DL
CALCIUM SERPL-MCNC: 9.4 MG/DL
CHLORIDE SERPL-SCNC: 107 MMOL/L
CHOLEST SERPL-MCNC: 141 MG/DL
CO2 SERPL-SCNC: 23 MMOL/L
COLOR: YELLOW
CREAT SERPL-MCNC: 0.71 MG/DL
EGFR: 94 ML/MIN/1.73M2
EOSINOPHIL # BLD AUTO: 0.03 K/UL
EOSINOPHIL NFR BLD AUTO: 1 %
GLUCOSE QUALITATIVE U: NEGATIVE MG/DL
GLUCOSE SERPL-MCNC: 96 MG/DL
HCT VFR BLD CALC: 40.2 %
HDLC SERPL-MCNC: 60 MG/DL
HGB BLD-MCNC: 12.9 G/DL
IMM GRANULOCYTES NFR BLD AUTO: 0 %
KETONES URINE: NEGATIVE MG/DL
LDLC SERPL CALC-MCNC: 63 MG/DL
LEUKOCYTE ESTERASE URINE: NEGATIVE
LYMPHOCYTES # BLD AUTO: 1.46 K/UL
LYMPHOCYTES NFR BLD AUTO: 47.6 %
MAN DIFF?: NORMAL
MCHC RBC-ENTMCNC: 29.2 PG
MCHC RBC-ENTMCNC: 32.1 GM/DL
MCV RBC AUTO: 91 FL
MONOCYTES # BLD AUTO: 0.4 K/UL
MONOCYTES NFR BLD AUTO: 13 %
NEUTROPHILS # BLD AUTO: 1.16 K/UL
NEUTROPHILS NFR BLD AUTO: 37.7 %
NITRITE URINE: NEGATIVE
NONHDLC SERPL-MCNC: 81 MG/DL
PH URINE: 6.5
PLATELET # BLD AUTO: 214 K/UL
POTASSIUM SERPL-SCNC: 4.9 MMOL/L
PROT SERPL-MCNC: 7.2 G/DL
PROTEIN URINE: NEGATIVE MG/DL
RBC # BLD: 4.42 M/UL
RBC # FLD: 13.9 %
SODIUM SERPL-SCNC: 141 MMOL/L
SPECIFIC GRAVITY URINE: 1.02
TRIGL SERPL-MCNC: 95 MG/DL
UROBILINOGEN URINE: 0.2 MG/DL
WBC # FLD AUTO: 3.07 K/UL

## 2024-06-14 RX ORDER — ATORVASTATIN CALCIUM 40 MG/1
40 TABLET, FILM COATED ORAL
Qty: 90 | Refills: 1 | Status: ACTIVE | COMMUNITY
Start: 2024-06-13 | End: 1900-01-01

## 2024-07-11 NOTE — PATIENT PROFILE ADULT - EQUIPMENT CURRENTLY USED AT HOME
Symptomatic blood pressure very well-controlled agree continue manage medication as follows    BMP normal reviewed as follows    Amlodipine 10 mg daily     Jardiance 25 mg daily     Lasix 40 mg daily     Metoprolol  mg daily    Lab Results   Component Value Date    SODIUM 140 07/08/2024    K 4.3 07/08/2024     07/08/2024    CO2 27 07/08/2024    BUN 16 07/08/2024    CREATININE 1.21 07/08/2024    CALCIUM 9.4 07/08/2024       no

## 2024-07-26 ENCOUNTER — APPOINTMENT (OUTPATIENT)
Dept: ULTRASOUND IMAGING | Facility: CLINIC | Age: 64
End: 2024-07-26
Payer: MEDICARE

## 2024-07-26 ENCOUNTER — RESULT REVIEW (OUTPATIENT)
Age: 64
End: 2024-07-26

## 2024-07-26 ENCOUNTER — APPOINTMENT (OUTPATIENT)
Dept: MAMMOGRAPHY | Facility: CLINIC | Age: 64
End: 2024-07-26
Payer: MEDICARE

## 2024-07-26 PROCEDURE — 77067 SCR MAMMO BI INCL CAD: CPT | Mod: 26

## 2024-07-26 PROCEDURE — 77063 BREAST TOMOSYNTHESIS BI: CPT | Mod: 26

## 2024-07-26 PROCEDURE — 76641 ULTRASOUND BREAST COMPLETE: CPT | Mod: 26,50

## 2024-07-26 PROCEDURE — 76700 US EXAM ABDOM COMPLETE: CPT | Mod: 26

## 2024-07-29 ENCOUNTER — APPOINTMENT (OUTPATIENT)
Dept: OBGYN | Facility: CLINIC | Age: 64
End: 2024-07-29

## 2024-08-14 ENCOUNTER — EMERGENCY (EMERGENCY)
Facility: HOSPITAL | Age: 64
LOS: 0 days | Discharge: HOME | End: 2024-08-14
Payer: MEDICARE

## 2024-08-14 VITALS
HEART RATE: 73 BPM | HEIGHT: 62 IN | RESPIRATION RATE: 18 BRPM | SYSTOLIC BLOOD PRESSURE: 115 MMHG | WEIGHT: 119.93 LBS | TEMPERATURE: 99 F | DIASTOLIC BLOOD PRESSURE: 60 MMHG | OXYGEN SATURATION: 98 %

## 2024-08-14 VITALS
RESPIRATION RATE: 20 BRPM | TEMPERATURE: 99 F | HEART RATE: 79 BPM | DIASTOLIC BLOOD PRESSURE: 65 MMHG | SYSTOLIC BLOOD PRESSURE: 96 MMHG | OXYGEN SATURATION: 98 %

## 2024-08-14 DIAGNOSIS — M25.475 EFFUSION, LEFT FOOT: ICD-10-CM

## 2024-08-14 DIAGNOSIS — M79.672 PAIN IN LEFT FOOT: ICD-10-CM

## 2024-08-14 DIAGNOSIS — Z98.891 HISTORY OF UTERINE SCAR FROM PREVIOUS SURGERY: Chronic | ICD-10-CM

## 2024-08-14 DIAGNOSIS — E78.00 PURE HYPERCHOLESTEROLEMIA, UNSPECIFIED: ICD-10-CM

## 2024-08-14 PROCEDURE — 93971 EXTREMITY STUDY: CPT | Mod: 26,LT

## 2024-08-14 PROCEDURE — 73630 X-RAY EXAM OF FOOT: CPT | Mod: 26,LT

## 2024-08-14 PROCEDURE — 99284 EMERGENCY DEPT VISIT MOD MDM: CPT

## 2024-08-14 NOTE — ED ADULT NURSE NOTE - NSSEPSISNEWALTERMENTAL_ED_A_ED
Elysia Mr. Berg,  Some of your results came back and urine shows no white cells, some red cells less than before. Protein also better.Rrest of tests not back. If you have any further concerns please do not hesitate to contact us by message, phone or making an appointment.  Have a good day   Dr Bladimir FRANCO No

## 2024-08-14 NOTE — ED PROVIDER NOTE - NS ED ROS FT
CONSTITUTIONAL: No fever, no chills, no fatigue  EYES: No visual changes  ENT: No ear pain, no sore throat  CARDIOVASCULAR: No chest pain, no palpitations  RESPIRATORY: No cough, no SOB  GI: No abdominal pain, no nausea, no vomiting, no constipation, no diarrhea  GENITOURINARY: No dysuria, no frequency, no hematuria  MUSKULOSKELETAL: No backpain, no joint pain  SKIN: No rash  NEURO: No headache    ALL OTHER SYSTEMS NEGATIVE.

## 2024-08-14 NOTE — ED ADULT TRIAGE NOTE - SPO2 (%)
[facility]    Brief Operative Note    Pre-operative diagnosis: Wrist pain [M25.539]  Post-operative diagnosis Same as pre-operative diagnosis    Procedure: Procedure(s):  RELEASE, CARPAL TUNNEL  Surgeon: Surgeon(s) and Role:     * Margarito Bashir MD - Primary  Anesthesia: Monitor Anesthesia Care   Estimated blood loss: None  Drains: None  Specimens: * No specimens in log *  Findings:   None.  Complications: None.  Implants: * No implants in log *        
98

## 2024-08-14 NOTE — ED ADULT NURSE NOTE - DOES PATIENT HAVE ADVANCE DIRECTIVE
Pt made aware of results and recommendations. Pt VU and is in agreement with PT.     ----- Message from CATY Butt sent at 11/3/2023 11:00 AM CDT -----  Please inform patient of results.    1. Degenerative changes to right knee.  Recommend anti-inflammatories and physical therapy if in agreement.    All other findings within acceptable ranges.  
No

## 2024-08-14 NOTE — ED PROVIDER NOTE - PATIENT PORTAL LINK FT
You can access the FollowMyHealth Patient Portal offered by [facility] by registering at the following website: [email or web address]. By joining [facility]’s FollowMyHealth portal, you will also be able to view your health information using other applications (apps) compatible with our system.

## 2024-08-14 NOTE — ED PROVIDER NOTE - PHYSICAL EXAMINATION
GEN: Awake, alert, interactive, NAD.  HEAD AND NECK: NC/AT. Airway patent. Neck supple.   EYES:  Clear b/l.    ENT: Moist mucus membranes.   CARDIAC: Regular rate, regular rhythm. No evident pedal edema.    RESP/CHEST: Normal respiratory effort with no use of accessory muscles or retractions. Clear throughout on auscultation.  ABD: soft, non-distended, non-tender. No rebound, no guarding.   BACK: No midline spinal TTP. No CVAT.   EXTREMITIES: LLE: Mild swelling to the left lateral foot  with tenderness to palpation. (+) pulses intact. (+) FROM of  ankle, toes, and knee, (-) erythema. Moving all extremities with no apparent deformities.   SKIN: Warm, dry, intact normal color. No rash.   NEURO: AOx3, CN II-XII grossly intact, no focal deficits.   PSYCH: Appropriate mood and affect.

## 2024-08-14 NOTE — ED PROVIDER NOTE - CLINICAL SUMMARY MEDICAL DECISION MAKING FREE TEXT BOX
62 y/o female with high chol here with left foot pain and swelling x 1 week atraumatic.  Ddx include but not limited to foot sprain, dvt altough low suspicion.   Will obtain xray r/o fracture or dislocation, US r/o dvt  and treat with pain meds. 62 y/o female with high chol here with left foot pain and swelling x 1 week atraumatic.  Ddx include but not limited to foot sprain, fracture, tendonitis, dvt altough low suspicion. No signs of infection.   Will obtain xray r/o fracture or dislocation, US r/o dvt  and treat with pain meds.    US negative for DVT. XR reviewed and no fracture or dislocation.   Ace bandage applied and surgical shoe given. Pt stable to be discharged home and follow up with podiatry. Pt advised to RICE. Soak foot in epsom salt and wear loose fitting shoes.

## 2024-08-14 NOTE — ED PROVIDER NOTE - NSFOLLOWUPINSTRUCTIONS_ED_ALL_ED_FT
Rest, drink plenty of fluids.  Advance activity as tolerated.  Continue all previously prescribed medications as directed.  Follow up with your podiatry  in 48-72 hours- bring copies of your results.  Return to the ER for worsening or persistent symptoms, and/or ANY NEW OR CONCERNING SYMPTOMS. If you have issues obtaining follow up, please call: [phone] to obtain a doctor or specialist who takes your insurance in your area.  You may call [phone] to make an appointment with the internal medicine clinic.

## 2024-08-14 NOTE — ED PROVIDER NOTE - CARE PROVIDER_API CALL
Linda Oneill  Podiatric Medicine and Surgery  [address] [place] [id number]  Phone: [phone]  Fax: [phone]  Follow Up Time: 4-6 Days

## 2024-08-14 NOTE — ED PROVIDER NOTE - NSCAREINITIATED _GEN_ER
What Type Of Note Output Would You Prefer (Optional)?: Bullet Format
How Severe Is Your Skin Lesion?: mild
Has Your Skin Lesion Been Treated?: not been treated
Is This A New Presentation, Or A Follow-Up?: Skin Lesion
Additional History: Patient feels her mole is getting darker
Tere Yeung(PA)

## 2024-08-14 NOTE — ED ADULT TRIAGE NOTE - CHIEF COMPLAINT QUOTE
c/o L foot pain and swelling x7 days. Went to urgent care had x-rays done x3 days ago. Denies chest pain, SOB, fall, trauma.

## 2024-08-21 ENCOUNTER — APPOINTMENT (OUTPATIENT)
Dept: FAMILY MEDICINE | Facility: CLINIC | Age: 64
End: 2024-08-21

## 2024-08-26 ENCOUNTER — TRANSCRIPTION ENCOUNTER (OUTPATIENT)
Age: 64
End: 2024-08-26

## 2024-09-05 RX ORDER — POLYETHYLENE GLYCOL 3350, SODIUM SULFATE, SODIUM CHLORIDE, POTASSIUM CHLORIDE, ASCORBIC ACID, SODIUM ASCORBATE 140-9-5.2G
140 KIT ORAL
Qty: 1 | Refills: 0 | Status: ACTIVE | COMMUNITY
Start: 2024-09-05 | End: 1900-01-01

## 2024-09-06 RX ORDER — POLYETHYLENE GLYCOL 3350, SODIUM SULFATE, SODIUM CHLORIDE, POTASSIUM CHLORIDE, ASCORBIC ACID, SODIUM ASCORBATE 140-9-5.2G
140 KIT ORAL
Qty: 1 | Refills: 0 | Status: ACTIVE | COMMUNITY
Start: 2024-09-06 | End: 1900-01-01

## 2024-09-16 ENCOUNTER — RX RENEWAL (OUTPATIENT)
Age: 64
End: 2024-09-16

## 2024-09-25 ENCOUNTER — RX RENEWAL (OUTPATIENT)
Age: 64
End: 2024-09-25

## 2024-12-14 ENCOUNTER — RX RENEWAL (OUTPATIENT)
Age: 64
End: 2024-12-14

## 2025-01-08 ENCOUNTER — APPOINTMENT (OUTPATIENT)
Dept: GASTROENTEROLOGY | Facility: HOSPITAL | Age: 65
End: 2025-01-08

## 2025-01-08 ENCOUNTER — RESULT REVIEW (OUTPATIENT)
Age: 65
End: 2025-01-08

## 2025-01-08 ENCOUNTER — TRANSCRIPTION ENCOUNTER (OUTPATIENT)
Age: 65
End: 2025-01-08

## 2025-01-08 ENCOUNTER — OUTPATIENT (OUTPATIENT)
Dept: OUTPATIENT SERVICES | Facility: HOSPITAL | Age: 65
LOS: 1 days | End: 2025-01-08
Payer: MEDICARE

## 2025-01-08 VITALS
RESPIRATION RATE: 16 BRPM | SYSTOLIC BLOOD PRESSURE: 122 MMHG | DIASTOLIC BLOOD PRESSURE: 74 MMHG | OXYGEN SATURATION: 100 % | HEART RATE: 60 BPM

## 2025-01-08 VITALS
HEART RATE: 75 BPM | TEMPERATURE: 97 F | SYSTOLIC BLOOD PRESSURE: 139 MMHG | WEIGHT: 119.05 LBS | HEIGHT: 62 IN | DIASTOLIC BLOOD PRESSURE: 76 MMHG | RESPIRATION RATE: 15 BRPM | OXYGEN SATURATION: 100 %

## 2025-01-08 DIAGNOSIS — K21.9 GASTRO-ESOPHAGEAL REFLUX DISEASE WITHOUT ESOPHAGITIS: ICD-10-CM

## 2025-01-08 DIAGNOSIS — R19.7 DIARRHEA, UNSPECIFIED: ICD-10-CM

## 2025-01-08 DIAGNOSIS — Z98.891 HISTORY OF UTERINE SCAR FROM PREVIOUS SURGERY: Chronic | ICD-10-CM

## 2025-01-08 PROCEDURE — 43259 EGD US EXAM DUODENUM/JEJUNUM: CPT

## 2025-01-08 PROCEDURE — 43239 EGD BIOPSY SINGLE/MULTIPLE: CPT

## 2025-01-08 PROCEDURE — 88305 TISSUE EXAM BY PATHOLOGIST: CPT

## 2025-01-08 PROCEDURE — 88305 TISSUE EXAM BY PATHOLOGIST: CPT | Mod: 26

## 2025-01-08 PROCEDURE — 45378 DIAGNOSTIC COLONOSCOPY: CPT

## 2025-01-08 PROCEDURE — G0121: CPT

## 2025-01-08 RX ORDER — ESCITALOPRAM OXALATE 10 MG/1
1 TABLET ORAL
Refills: 0 | DISCHARGE

## 2025-01-08 RX ORDER — SODIUM CHLORIDE 9 MG/ML
500 INJECTION, SOLUTION INTRAMUSCULAR; INTRAVENOUS; SUBCUTANEOUS
Refills: 0 | Status: COMPLETED | OUTPATIENT
Start: 2025-01-08 | End: 2025-01-08

## 2025-01-08 RX ADMIN — SODIUM CHLORIDE 30 MILLILITER(S): 9 INJECTION, SOLUTION INTRAMUSCULAR; INTRAVENOUS; SUBCUTANEOUS at 10:36

## 2025-01-08 NOTE — PRE PROCEDURE NOTE - PRE PROCEDURE EVALUATION
Attending Physician:  Dr. Patton                Procedure: EGD EUS colonoscopy    Indication for Procedure: dyspepsia CRC screening  ________________________________________________________  PAST MEDICAL & SURGICAL HISTORY:  Hyperlipidemia, unspecified hyperlipidemia type      Depression, unspecified depression type      SBO (small bowel obstruction)      H/O:   x3        ALLERGIES:  No Known Allergies    HOME MEDICATIONS:  escitalopram 10 mg oral tablet: 1 tab(s) orally once a day    AICD/PPM: [ ] yes   [x ] no    PERTINENT LAB DATA:                      PHYSICAL EXAMINATION:    Height (cm): 157.5  Weight (kg): 54  BMI (kg/m2): 21.8  BSA (m2): 1.53T(C): 36.3  HR: 75  BP: 139/76  RR: 15  SpO2: 100%    Constitutional: NAD  HEENT: PERRLA, EOMI,    Neck:  No JVD  Respiratory: CTAB/L  Cardiovascular: S1 and S2  Gastrointestinal: BS+, soft, NT/ND  Extremities: No peripheral edema  Neurological: A/O x 3, no focal deficits  Psychiatric: Normal mood, normal affect  Skin: No rashes    ASA Class: I [ ]  II [x ]  III [ ]  IV [ ]    COMMENTS:    The patient is a suitable candidate for the planned procedure unless box checked [ ]  No, explain:

## 2025-01-08 NOTE — ASU PATIENT PROFILE, ADULT - FALL HARM RISK - HARM RISK INTERVENTIONS

## 2025-01-08 NOTE — ASU DISCHARGE PLAN (ADULT/PEDIATRIC) - FINANCIAL ASSISTANCE
[facility] provides services at a reduced cost to those who are determined to be eligible through [facility]’s financial assistance program. Information regarding [facility]’s financial assistance program can be found by going to [email or web address] or by calling [phone].

## 2025-01-10 LAB — SURGICAL PATHOLOGY STUDY: SIGNIFICANT CHANGE UP

## 2025-01-27 ENCOUNTER — APPOINTMENT (OUTPATIENT)
Dept: GASTROENTEROLOGY | Facility: CLINIC | Age: 65
End: 2025-01-27

## 2025-02-27 ENCOUNTER — APPOINTMENT (OUTPATIENT)
Dept: GASTROENTEROLOGY | Facility: CLINIC | Age: 65
End: 2025-02-27

## 2025-05-29 ENCOUNTER — APPOINTMENT (OUTPATIENT)
Dept: FAMILY MEDICINE | Facility: CLINIC | Age: 65
End: 2025-05-29
Payer: MEDICARE

## 2025-05-29 VITALS
SYSTOLIC BLOOD PRESSURE: 126 MMHG | OXYGEN SATURATION: 98 % | DIASTOLIC BLOOD PRESSURE: 78 MMHG | HEART RATE: 76 BPM | RESPIRATION RATE: 17 BRPM | TEMPERATURE: 98.2 F

## 2025-05-29 DIAGNOSIS — B02.9 ZOSTER W/OUT COMPLICATIONS: ICD-10-CM

## 2025-05-29 DIAGNOSIS — Z71.89 OTHER SPECIFIED COUNSELING: ICD-10-CM

## 2025-05-29 DIAGNOSIS — Z87.898 PERSONAL HISTORY OF OTHER SPECIFIED CONDITIONS: ICD-10-CM

## 2025-05-29 DIAGNOSIS — M79.2 NEURALGIA AND NEURITIS, UNSPECIFIED: ICD-10-CM

## 2025-05-29 PROCEDURE — 99213 OFFICE O/P EST LOW 20 MIN: CPT

## 2025-05-29 RX ORDER — GABAPENTIN 100 MG/1
100 CAPSULE ORAL
Qty: 40 | Refills: 0 | Status: ACTIVE | COMMUNITY
Start: 2025-05-29 | End: 1900-01-01

## 2025-05-29 RX ORDER — PREDNISONE 20 MG/1
20 TABLET ORAL
Qty: 12 | Refills: 0 | Status: ACTIVE | COMMUNITY
Start: 2025-05-29 | End: 1900-01-01

## 2025-05-29 RX ORDER — LIDOCAINE HYDROCHLORIDE 30 MG/G
3 CREAM TOPICAL
Qty: 1 | Refills: 0 | Status: ACTIVE | COMMUNITY
Start: 2025-05-29 | End: 1900-01-01

## 2025-08-07 ENCOUNTER — APPOINTMENT (OUTPATIENT)
Dept: FAMILY MEDICINE | Facility: CLINIC | Age: 65
End: 2025-08-07
Payer: MEDICARE

## 2025-08-07 ENCOUNTER — LABORATORY RESULT (OUTPATIENT)
Age: 65
End: 2025-08-07

## 2025-08-07 VITALS
OXYGEN SATURATION: 97 % | TEMPERATURE: 98.8 F | SYSTOLIC BLOOD PRESSURE: 121 MMHG | DIASTOLIC BLOOD PRESSURE: 72 MMHG | RESPIRATION RATE: 17 BRPM | BODY MASS INDEX: 19.77 KG/M2 | HEIGHT: 66 IN | HEART RATE: 70 BPM | WEIGHT: 123 LBS

## 2025-08-07 DIAGNOSIS — J30.9 ALLERGIC RHINITIS, UNSPECIFIED: ICD-10-CM

## 2025-08-07 DIAGNOSIS — L98.9 DISORDER OF THE SKIN AND SUBCUTANEOUS TISSUE, UNSPECIFIED: ICD-10-CM

## 2025-08-07 DIAGNOSIS — L81.9 DISORDER OF PIGMENTATION, UNSPECIFIED: ICD-10-CM

## 2025-08-07 DIAGNOSIS — Z87.898 PERSONAL HISTORY OF OTHER SPECIFIED CONDITIONS: ICD-10-CM

## 2025-08-07 DIAGNOSIS — M25.562 PAIN IN RIGHT KNEE: ICD-10-CM

## 2025-08-07 DIAGNOSIS — M79.673 PAIN IN UNSPECIFIED FOOT: ICD-10-CM

## 2025-08-07 DIAGNOSIS — M26.629 ARTHRALGIA OF TEMPOROMANDIBULAR JOINT,: ICD-10-CM

## 2025-08-07 DIAGNOSIS — Z00.00 ENCOUNTER FOR GENERAL ADULT MEDICAL EXAMINATION W/OUT ABNORMAL FINDINGS: ICD-10-CM

## 2025-08-07 DIAGNOSIS — R14.0 ABDOMINAL DISTENSION (GASEOUS): ICD-10-CM

## 2025-08-07 DIAGNOSIS — R73.03 PREDIABETES.: ICD-10-CM

## 2025-08-07 DIAGNOSIS — F41.8 OTHER SPECIFIED ANXIETY DISORDERS: ICD-10-CM

## 2025-08-07 DIAGNOSIS — M25.561 PAIN IN RIGHT KNEE: ICD-10-CM

## 2025-08-07 DIAGNOSIS — E55.9 VITAMIN D DEFICIENCY, UNSPECIFIED: ICD-10-CM

## 2025-08-07 DIAGNOSIS — M81.0 AGE-RELATED OSTEOPOROSIS W/OUT CURRENT PATHOLOGICAL FRACTURE: ICD-10-CM

## 2025-08-07 DIAGNOSIS — Z87.42 PERSONAL HISTORY OF OTHER DISEASES OF THE FEMALE GENITAL TRACT: ICD-10-CM

## 2025-08-07 DIAGNOSIS — M25.579 PAIN IN UNSPECIFIED ANKLE AND JOINTS OF UNSPECIFIED FOOT: ICD-10-CM

## 2025-08-07 DIAGNOSIS — Z71.89 OTHER SPECIFIED COUNSELING: ICD-10-CM

## 2025-08-07 DIAGNOSIS — R05.9 COUGH, UNSPECIFIED: ICD-10-CM

## 2025-08-07 DIAGNOSIS — Z87.19 PERSONAL HISTORY OF OTHER DISEASES OF THE DIGESTIVE SYSTEM: ICD-10-CM

## 2025-08-07 DIAGNOSIS — E04.1 NONTOXIC SINGLE THYROID NODULE: ICD-10-CM

## 2025-08-07 DIAGNOSIS — Z23 ENCOUNTER FOR IMMUNIZATION: ICD-10-CM

## 2025-08-07 DIAGNOSIS — R91.1 SOLITARY PULMONARY NODULE: ICD-10-CM

## 2025-08-07 DIAGNOSIS — R10.9 UNSPECIFIED ABDOMINAL PAIN: ICD-10-CM

## 2025-08-07 DIAGNOSIS — E78.5 HYPERLIPIDEMIA, UNSPECIFIED: ICD-10-CM

## 2025-08-07 DIAGNOSIS — M79.2 NEURALGIA AND NEURITIS, UNSPECIFIED: ICD-10-CM

## 2025-08-07 DIAGNOSIS — Z86.19 PERSONAL HISTORY OF OTHER INFECTIOUS AND PARASITIC DISEASES: ICD-10-CM

## 2025-08-07 PROCEDURE — 90684 PCV21 VACCINE IM: CPT

## 2025-08-07 PROCEDURE — G0439: CPT

## 2025-08-07 PROCEDURE — 93000 ELECTROCARDIOGRAM COMPLETE: CPT

## 2025-08-07 PROCEDURE — G0009: CPT

## 2025-08-07 PROCEDURE — 36415 COLL VENOUS BLD VENIPUNCTURE: CPT

## 2025-08-07 PROCEDURE — 99213 OFFICE O/P EST LOW 20 MIN: CPT | Mod: 25

## 2025-08-07 RX ORDER — VITAMIN E ACID SUCCINATE 268 MG
TABLET ORAL
Refills: 0 | Status: ACTIVE | COMMUNITY

## 2025-08-08 LAB
25(OH)D3 SERPL-MCNC: 38.8 NG/ML
ALBUMIN SERPL ELPH-MCNC: 4.6 G/DL
ALP BLD-CCNC: 90 U/L
ALT SERPL-CCNC: 18 U/L
ANION GAP SERPL CALC-SCNC: 15 MMOL/L
APPEARANCE: CLEAR
AST SERPL-CCNC: 22 U/L
BASOPHILS # BLD AUTO: 0.04 K/UL
BASOPHILS NFR BLD AUTO: 0.8 %
BILIRUB SERPL-MCNC: 0.7 MG/DL
BILIRUBIN URINE: NEGATIVE
BLOOD URINE: NEGATIVE
BUN SERPL-MCNC: 11 MG/DL
CALCIUM SERPL-MCNC: 9.8 MG/DL
CHLORIDE SERPL-SCNC: 107 MMOL/L
CHOLEST SERPL-MCNC: 243 MG/DL
CO2 SERPL-SCNC: 22 MMOL/L
COLOR: YELLOW
CREAT SERPL-MCNC: 0.71 MG/DL
EGFRCR SERPLBLD CKD-EPI 2021: 94 ML/MIN/1.73M2
EOSINOPHIL # BLD AUTO: 0.05 K/UL
EOSINOPHIL NFR BLD AUTO: 1.1 %
ESTIMATED AVERAGE GLUCOSE: 120 MG/DL
GLUCOSE QUALITATIVE U: NEGATIVE
GLUCOSE SERPL-MCNC: 95 MG/DL
HBA1C MFR BLD HPLC: 5.8 %
HCT VFR BLD CALC: 37.9 %
HDLC SERPL-MCNC: 60 MG/DL
HGB BLD-MCNC: 12.9 G/DL
IMM GRANULOCYTES NFR BLD AUTO: 0.2 %
KETONES URINE: NORMAL
LDLC SERPL-MCNC: 158 MG/DL
LEUKOCYTE ESTERASE URINE: NEGATIVE
LYMPHOCYTES # BLD AUTO: 1.47 K/UL
LYMPHOCYTES NFR BLD AUTO: 31.2 %
MAN DIFF?: NORMAL
MCHC RBC-ENTMCNC: 30.3 PG
MCHC RBC-ENTMCNC: 34 G/DL
MCV RBC AUTO: 89 FL
MONOCYTES # BLD AUTO: 0.43 K/UL
MONOCYTES NFR BLD AUTO: 9.1 %
NEUTROPHILS # BLD AUTO: 2.71 K/UL
NEUTROPHILS NFR BLD AUTO: 57.6 %
NITRITE URINE: NEGATIVE
NONHDLC SERPL-MCNC: 183 MG/DL
PH URINE: 6
PLATELET # BLD AUTO: 209 K/UL
POTASSIUM SERPL-SCNC: 5.1 MMOL/L
PROT SERPL-MCNC: 7.1 G/DL
PROTEIN URINE: ABNORMAL
RBC # BLD: 4.26 M/UL
RBC # FLD: 13.5 %
SODIUM SERPL-SCNC: 143 MMOL/L
SPECIFIC GRAVITY URINE: >=1.03
TRIGL SERPL-MCNC: 143 MG/DL
TSH SERPL-ACNC: 0.6 UIU/ML
UROBILINOGEN URINE: NORMAL
WBC # FLD AUTO: 4.71 K/UL

## 2025-09-12 ENCOUNTER — APPOINTMENT (OUTPATIENT)
Dept: RADIOLOGY | Facility: CLINIC | Age: 65
End: 2025-09-12
Payer: MEDICARE

## 2025-09-12 ENCOUNTER — APPOINTMENT (OUTPATIENT)
Dept: CT IMAGING | Facility: CLINIC | Age: 65
End: 2025-09-12
Payer: MEDICARE

## 2025-09-12 PROCEDURE — 77080 DXA BONE DENSITY AXIAL: CPT | Mod: 26

## 2025-09-12 PROCEDURE — 71250 CT THORAX DX C-: CPT | Mod: 26

## 2025-09-16 ENCOUNTER — APPOINTMENT (OUTPATIENT)
Dept: DERMATOLOGY | Facility: CLINIC | Age: 65
End: 2025-09-16

## (undated) DEVICE — POLY TRAP ETRAP

## (undated) DEVICE — SENSOR O2 FINGER ADULT

## (undated) DEVICE — Device

## (undated) DEVICE — CATH IV SAFE BC 20G X 1.16" (PINK)

## (undated) DEVICE — BIOPSY FORCEP RADIAL JAW 4 STANDARD WITH NEEDLE

## (undated) DEVICE — CLAMP BX HOT RAD JAW 3

## (undated) DEVICE — SUCTION YANKAUER NO CONTROL VENT

## (undated) DEVICE — TUBING SUCTION CONN 6FT STERILE

## (undated) DEVICE — SOL INJ NS 0.9% 500ML 2 PORT

## (undated) DEVICE — IRRIGATOR BIO SHIELD

## (undated) DEVICE — TUBING CAP SET ENDO 24HR USE GI

## (undated) DEVICE — CATH BLLN ULTRASONIC ENSOSCOPE

## (undated) DEVICE — ELCTR GROUNDING PAD ADULT COVIDIEN

## (undated) DEVICE — CATH IV SAFE BC 22G X 1" (BLUE)

## (undated) DEVICE — TUBING IV SET GRAVITY 3Y 100" MACRO

## (undated) DEVICE — PACK IV START WITH CHG

## (undated) DEVICE — FOLEY HOLDER STATLOCK 2 WAY ADULT

## (undated) DEVICE — TUBING SUCTION 20FT